# Patient Record
Sex: MALE | Race: ASIAN | NOT HISPANIC OR LATINO | URBAN - METROPOLITAN AREA
[De-identification: names, ages, dates, MRNs, and addresses within clinical notes are randomized per-mention and may not be internally consistent; named-entity substitution may affect disease eponyms.]

---

## 2020-09-21 ENCOUNTER — EMERGENCY (EMERGENCY)
Facility: HOSPITAL | Age: 50
LOS: 1 days | Discharge: HOME | End: 2020-09-21
Attending: STUDENT IN AN ORGANIZED HEALTH CARE EDUCATION/TRAINING PROGRAM
Payer: MEDICAID

## 2020-09-21 VITALS
OXYGEN SATURATION: 95 % | DIASTOLIC BLOOD PRESSURE: 71 MMHG | SYSTOLIC BLOOD PRESSURE: 107 MMHG | HEIGHT: 73 IN | HEART RATE: 77 BPM | TEMPERATURE: 98 F | RESPIRATION RATE: 18 BRPM | WEIGHT: 153 LBS

## 2020-09-21 PROCEDURE — 99283 EMERGENCY DEPT VISIT LOW MDM: CPT

## 2020-09-21 RX ORDER — IBUPROFEN 200 MG
600 TABLET ORAL ONCE
Refills: 0 | Status: COMPLETED | OUTPATIENT
Start: 2020-09-21 | End: 2020-09-21

## 2020-09-21 RX ADMIN — Medication 600 MILLIGRAM(S): at 15:59

## 2020-09-21 NOTE — ED PROVIDER NOTE - PATIENT PORTAL LINK FT
You can access the FollowMyHealth Patient Portal offered by [facility] by registering at the following website: [email or web address]. By joining [employer]’s FollowMyHealth portal, you will also be able to view your health information using other applications (apps) compatible with our system.
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
## 2020-09-21 NOTE — ED ADULT NURSE NOTE - NSIMPLEMENTINTERV_GEN_ALL_ED
Implemented All Universal Safety Interventions:  [place] to call system. Call bell, personal items and telephone within reach. Instruct patient to call for assistance. Room bathroom lighting operational. Non-slip footwear when patient is off stretcher. Physically safe environment: no spills, clutter or unnecessary equipment. Stretcher in lowest position, wheels locked, appropriate side rails in place.
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
## 2020-09-21 NOTE — ED PROVIDER NOTE - NSFOLLOWUPINSTRUCTIONS_ED_ALL_ED_FT
Cervical Sprain (Neck Sprain)    A cervical sprain is when the connective tissues (ligaments) that hold the neck bones in place stretch or tear. Only take medicine as told by your doctor. You may apply heating or cooling pads (or ice) to help with the pain. Avoid positions and activities that make your problems worse.    SEEK IMMEDIATE MEDICAL CARE IF YOU HAVE ANY OF THE FOLLOWING SYMPTOMS: weakness, tingling, or numbness of part of the body, headache, dizziness or lightheadedness, fever, or difficulty swallowing or chewing.
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
## 2020-09-21 NOTE — ED PROVIDER NOTE - PHYSICAL EXAMINATION
PHYSICAL EXAMINATION:  VITALS REVIEWED.    GENERALIZED APPEARANCE:  Comfortable, no acute distress, ambulating without difficulty  SKIN:  Warm, dry, no cyanosis  HEAD:  No obvious scalp lesions, no focal tenderness  EYES:  Conjunctiva pink, no icterus  ENMT:  Mucus membranes moist, no stridor  NECK:  Supple, normal active neck ROM, no midline tenderness   CHEST AND RESPIRATORY:  Clear to auscultation B/L, good air entry B/L, equal chest expansion  HEART AND CARDIOVASCULAR:  Regular rate, no obvious murmur  ABDOMEN AND GI:  Soft, non-tender, non-distended.  No rebound, no guarding, no CVA-area tenderness  EXTREMITIES:  No deformity, edema, or calf tenderness, 5/5 strength in all 4 extremities    NEURO: AAOx3, gross motor and sensory intact  PSYCH: Normal affect

## 2020-09-21 NOTE — ED PROVIDER NOTE - NS ED ROS FT
ROS:  GEN: (-) fevers/chills, (-) weight loss, (-) fatigue/malaise  HEENT: (-) visual change, (-) photophobia, (-) nasal congestion/rhinorrhea, (-) difficulty swallowing  NECK: (-) stiffness, (-) swelling  RESP: (-) shortness of breath, (-) cough, (-) sputum, (-) hemoptysis, (-) DAVIS  CV: (-) chest pain, (-) palpitations, (-) PND/orthopnea  GI: (-) nausea, (-) vomiting, (-) pain, (-) constipation, (-) diarrhea, (-) melena, (-) BRBPR  : (-) frequency/urgency, (-) hematuria, (-) dysuria, (-) incontinence, (-) discharge  EXT: (-) edema, (-) cyanosis  ENDO: (-) heat/cold intolerance, (-) polyuria  NEURO: (-) paresthesias, (-) weakness, (-) headache, (-) dizziness, (-) syncope  Psych: (-) depression, (-) anxiety, (-) SI, (-) HI, (-) AH, (-) VH  MSK: no recent trauma, no muscle pain
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
## 2021-03-25 PROBLEM — Z00.00 ENCOUNTER FOR PREVENTIVE HEALTH EXAMINATION: Status: ACTIVE | Noted: 2021-03-25
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
## 2022-09-14 NOTE — ED ADULT NURSE NOTE - PAIN RATING/NUMBER SCALE (0-10): ACTIVITY
Immediate Post- Operative Note        Findings: Moderate Right Pleural Effusion    Procedure(s): US guided Right Thoracentesis      Estimated Blood Loss: Less than 5 ml        Complications: None            9/14/2022     3:57 PM     Isai Walker M.D.      4

## 2025-06-28 ENCOUNTER — INPATIENT (INPATIENT)
Facility: HOSPITAL | Age: 55
LOS: 9 days | Discharge: LEFT BEFORE DISCHARGE | DRG: 694 | End: 2025-07-08
Attending: HOSPITALIST | Admitting: STUDENT IN AN ORGANIZED HEALTH CARE EDUCATION/TRAINING PROGRAM
Payer: COMMERCIAL

## 2025-06-28 VITALS
WEIGHT: 169.98 LBS | HEIGHT: 73 IN | SYSTOLIC BLOOD PRESSURE: 119 MMHG | HEART RATE: 60 BPM | DIASTOLIC BLOOD PRESSURE: 84 MMHG | TEMPERATURE: 98 F | RESPIRATION RATE: 17 BRPM | OXYGEN SATURATION: 93 %

## 2025-06-28 LAB
ALBUMIN SERPL ELPH-MCNC: 4.3 G/DL — SIGNIFICANT CHANGE UP (ref 3.3–5)
ALP SERPL-CCNC: 96 U/L — SIGNIFICANT CHANGE UP (ref 40–120)
ALT FLD-CCNC: 34 U/L — SIGNIFICANT CHANGE UP (ref 10–45)
ANION GAP SERPL CALC-SCNC: 17 MMOL/L — SIGNIFICANT CHANGE UP (ref 5–17)
APTT BLD: 28.6 SEC — SIGNIFICANT CHANGE UP (ref 26.1–36.8)
AST SERPL-CCNC: 31 U/L — SIGNIFICANT CHANGE UP (ref 10–40)
BASOPHILS # BLD AUTO: 0.06 K/UL — SIGNIFICANT CHANGE UP (ref 0–0.2)
BASOPHILS NFR BLD AUTO: 0.6 % — SIGNIFICANT CHANGE UP (ref 0–2)
BILIRUB SERPL-MCNC: 0.8 MG/DL — SIGNIFICANT CHANGE UP (ref 0.2–1.2)
BLD GP AB SCN SERPL QL: NEGATIVE — SIGNIFICANT CHANGE UP
BUN SERPL-MCNC: 19 MG/DL — SIGNIFICANT CHANGE UP (ref 7–23)
CALCIUM SERPL-MCNC: 9.5 MG/DL — SIGNIFICANT CHANGE UP (ref 8.4–10.5)
CHLORIDE SERPL-SCNC: 106 MMOL/L — SIGNIFICANT CHANGE UP (ref 96–108)
CO2 SERPL-SCNC: 17 MMOL/L — LOW (ref 22–31)
CREAT SERPL-MCNC: 1.38 MG/DL — HIGH (ref 0.5–1.3)
EGFR: 60 ML/MIN/1.73M2 — SIGNIFICANT CHANGE UP
EGFR: 60 ML/MIN/1.73M2 — SIGNIFICANT CHANGE UP
EOSINOPHIL # BLD AUTO: 0.15 K/UL — SIGNIFICANT CHANGE UP (ref 0–0.5)
EOSINOPHIL NFR BLD AUTO: 1.5 % — SIGNIFICANT CHANGE UP (ref 0–6)
GAS PNL BLDV: SIGNIFICANT CHANGE UP
GLUCOSE SERPL-MCNC: 156 MG/DL — HIGH (ref 70–99)
HCT VFR BLD CALC: 52.7 % — HIGH (ref 39–50)
HGB BLD-MCNC: 18 G/DL — HIGH (ref 13–17)
IMM GRANULOCYTES # BLD AUTO: 0.04 K/UL — SIGNIFICANT CHANGE UP (ref 0–0.07)
IMM GRANULOCYTES NFR BLD AUTO: 0.4 % — SIGNIFICANT CHANGE UP (ref 0–0.9)
INR BLD: 1.05 RATIO — SIGNIFICANT CHANGE UP (ref 0.85–1.16)
LIDOCAIN IGE QN: 24 U/L — SIGNIFICANT CHANGE UP (ref 7–60)
LYMPHOCYTES # BLD AUTO: 1.68 K/UL — SIGNIFICANT CHANGE UP (ref 1–3.3)
LYMPHOCYTES NFR BLD AUTO: 17 % — SIGNIFICANT CHANGE UP (ref 13–44)
MCHC RBC-ENTMCNC: 30.8 PG — SIGNIFICANT CHANGE UP (ref 27–34)
MCHC RBC-ENTMCNC: 34.2 G/DL — SIGNIFICANT CHANGE UP (ref 32–36)
MCV RBC AUTO: 90.1 FL — SIGNIFICANT CHANGE UP (ref 80–100)
MONOCYTES # BLD AUTO: 0.51 K/UL — SIGNIFICANT CHANGE UP (ref 0–0.9)
MONOCYTES NFR BLD AUTO: 5.1 % — SIGNIFICANT CHANGE UP (ref 2–14)
NEUTROPHILS # BLD AUTO: 7.47 K/UL — HIGH (ref 1.8–7.4)
NEUTROPHILS NFR BLD AUTO: 75.4 % — SIGNIFICANT CHANGE UP (ref 43–77)
NRBC # BLD AUTO: 0 K/UL — SIGNIFICANT CHANGE UP (ref 0–0)
NRBC # FLD: 0 K/UL — SIGNIFICANT CHANGE UP (ref 0–0)
NRBC BLD AUTO-RTO: 0 /100 WBCS — SIGNIFICANT CHANGE UP (ref 0–0)
PLATELET # BLD AUTO: 196 K/UL — SIGNIFICANT CHANGE UP (ref 150–400)
PMV BLD: 12.7 FL — SIGNIFICANT CHANGE UP (ref 7–13)
POTASSIUM SERPL-MCNC: 4 MMOL/L — SIGNIFICANT CHANGE UP (ref 3.5–5.3)
POTASSIUM SERPL-SCNC: 4 MMOL/L — SIGNIFICANT CHANGE UP (ref 3.5–5.3)
PROT SERPL-MCNC: 7 G/DL — SIGNIFICANT CHANGE UP (ref 6–8.3)
PROTHROM AB SERPL-ACNC: 12 SEC — SIGNIFICANT CHANGE UP (ref 9.9–13.4)
RBC # BLD: 5.85 M/UL — HIGH (ref 4.2–5.8)
RBC # FLD: 13.6 % — SIGNIFICANT CHANGE UP (ref 10.3–14.5)
RH IG SCN BLD-IMP: POSITIVE — SIGNIFICANT CHANGE UP
SODIUM SERPL-SCNC: 140 MMOL/L — SIGNIFICANT CHANGE UP (ref 135–145)
WBC # BLD: 9.91 K/UL — SIGNIFICANT CHANGE UP (ref 3.8–10.5)
WBC # FLD AUTO: 9.91 K/UL — SIGNIFICANT CHANGE UP (ref 3.8–10.5)

## 2025-06-28 PROCEDURE — 99291 CRITICAL CARE FIRST HOUR: CPT

## 2025-06-28 PROCEDURE — 71045 X-RAY EXAM CHEST 1 VIEW: CPT | Mod: 26

## 2025-06-28 PROCEDURE — 93010 ELECTROCARDIOGRAM REPORT: CPT | Mod: 76

## 2025-06-28 RX ORDER — ONDANSETRON HCL/PF 4 MG/2 ML
4 VIAL (ML) INJECTION ONCE
Refills: 0 | Status: COMPLETED | OUTPATIENT
Start: 2025-06-28 | End: 2025-06-28

## 2025-06-28 RX ORDER — METHYLPREDNISOLONE ACETATE 80 MG/ML
40 INJECTION, SUSPENSION INTRA-ARTICULAR; INTRALESIONAL; INTRAMUSCULAR; SOFT TISSUE ONCE
Refills: 0 | Status: COMPLETED | OUTPATIENT
Start: 2025-06-28 | End: 2025-06-28

## 2025-06-28 RX ORDER — ACETAMINOPHEN 500 MG/5ML
1000 LIQUID (ML) ORAL ONCE
Refills: 0 | Status: COMPLETED | OUTPATIENT
Start: 2025-06-28 | End: 2025-06-28

## 2025-06-28 RX ADMIN — Medication 1000 MILLILITER(S): at 22:25

## 2025-06-28 RX ADMIN — Medication 4 MILLIGRAM(S): at 22:18

## 2025-06-28 RX ADMIN — Medication 400 MILLIGRAM(S): at 22:25

## 2025-06-28 RX ADMIN — METHYLPREDNISOLONE ACETATE 40 MILLIGRAM(S): 80 INJECTION, SUSPENSION INTRA-ARTICULAR; INTRALESIONAL; INTRAMUSCULAR; SOFT TISSUE at 23:20

## 2025-06-28 RX ADMIN — Medication 4 MILLIGRAM(S): at 22:16

## 2025-06-28 RX ADMIN — Medication 20 MILLIGRAM(S): at 22:22
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
## 2025-06-28 NOTE — ED PROVIDER NOTE - CLINICAL SUMMARY MEDICAL DECISION MAKING FREE TEXT BOX
Brooklyn Zafar PGY-1:  55-year-old male presenting with sudden onset abdominal pain in the left upper quadrant that started this evening after eating dinner.  Had episodes of nausea and vomiting x 5-6.  No abdominal surgeries.  No dark or tarry stools.  Also reporting shortness of breath that has worsened over the past year.  Family at bedside reports he had seen a physician on Saturday regarding his shortness of breath and was told he had low oxygen and was pending further tests.  No headaches, chest pain, urinary symptoms.    On arrival to ED patient is very uncomfortable.  Unable to obtain abdominal exam due to pain.  Patient is tachypneic and appears short of breath while talking.  He is afebrile.      After dose of pain medication patient is able to tolerate exam.  Tenderness to palpation in the left upper quadrant.  Patient becomes hypoxic to 88 on room air.  Was placed on 4 L nasal cannula with oxygen saturations in the 90s.  Patient was then placed on high flow nasal cannula with improvement in saturations to 95%.  EKG showing T wave inversions in inferior leads. lungs clear to auscultation bilaterally and no lower extremity edema.     Concern for intra-abdominal pathology and possible cardiac/pulmonary etiology.  Will obtain labs, CT chest abdomen pelvis. Brooklyn Zafar PGY-1:  55-year-old male presenting with sudden onset abdominal pain in the left upper quadrant that started this evening after eating dinner.  Had episodes of nausea and vomiting x 5-6.  No abdominal surgeries.  No dark or tarry stools.  Also reporting shortness of breath that has worsened over the past year.  Family at bedside reports he had seen a physician on Saturday regarding his shortness of breath and was told he had low oxygen and was pending further tests.  No headaches, chest pain, urinary symptoms.    On arrival to ED patient is very uncomfortable.  Unable to obtain abdominal exam due to pain.  Patient is tachypneic and appears short of breath while talking.  He is afebrile.      After dose of pain medication patient is able to tolerate exam.  Tenderness to palpation in the left upper quadrant.  Patient becomes hypoxic to 88 on room air.  Was placed on 4 L nasal cannula with oxygen saturations in the 90s.  Patient was then placed on high flow nasal cannula with improvement in saturations to 95%.  EKG showing T wave inversions in inferior leads. lungs clear to auscultation bilaterally and no lower extremity edema.     Concern for intra-abdominal pathology and possible cardiac/pulmonary etiology.  Will obtain labs, CT chest abdomen pelvis.    RGUJRAL Agree with resident note above. Patient presents with family, declined  services. Pt presents with acute onset of LUQ abd pain after eating. Denies any initial chest pain, SOB, cough, fever. Pt unable to give history or cooperate with exam due to pain and vomiting. Pt medicated and placed on tele monitor and noted to be hypoxic. Pt now states he was recently evaluated by his doctor and told him that his oxygen levels were low in the 70s. Pt has history of smoking. Currently denies any chest pain. On exam, Lungs cta b/l, +s1s2, abd soft guarded, ttp LUQ. Pt placed on NC and escalated to HF. Will obtain Labs, CT chest and abd, flu/covid to eval for symptoms. EKG reviewed, will obtain cards consult. DDx ro ACS, Pulm HTN, PE, intrabd path. Closely monitor.

## 2025-06-28 NOTE — ED PROVIDER NOTE - PHYSICAL EXAMINATION
GENERAL APPEARANCE:  AxOx4, uncomfortable appearing M  HEENT:  NC, AT. dry mucous membranes. EOMI, clear conjunctiva, oropharynx clear.   NECK:  Supple without lymphadenopathy.  No stiffness or restricted ROM.   HEART:  Normal rate and regular rhythm, normal S1/S1, no m/r/g   LUNGS:  CTAB, moving air well. No crackles or wheezes are heard.   ABDOMEN:  soft, tenderness in LUQ  BACK: No CVAT, no obvious deformity.   EXTREMITIES:  Without cyanosis, clubbing or edema.   NEUROLOGICAL:  Grossly nonfocal. Alert and oriented, moving all 4 extremities. CN not formally tested but appear grossly intact.   SKIN:  Warm and dry without any rash.

## 2025-06-28 NOTE — ED PROVIDER NOTE - SECONDARY DIAGNOSIS.
"Wound care supplies were not ordered or needed today. Home care will order all your supplies    Continue on all blood pressure medications as prescribed    Focus on weight loss and low sodium diet  Keep sodium intake 2.5-4 grams per day    Keep appt with Sleep medicine; they are concerned that your BP is not being controlled due to uncontrolled sleep apnea        Wound Care Instructions    Every other day home care or your family will , Cleanse your bilateral legs and wound(s) with Dilute hibiclens 30cc in 500cc NS.    Then do a light wash of Dakin's solution; focus on the right lateral leg weeping area    Pat Dry with non-sterile gauze    Apply Lotion to the intact skin surrounding your wound and other dry skin locations. Some good lotions include: Remedy Skin Repair Cream, Sarna, Vanicream or Cetaphil    Apply Ammonium Lac Hydrin lotion to the thick scaling crusting areas    Triad paste to the periwound skin on the right leg to protect from all the drainage    Primary Dressing: Silvercel to all the wounds    ABDs or super  absorbant pads    Secure with non-sterile roll gauze (4\" x 75\" roll) and tape (1\" roll tape) as needed; avoid adhesive directly on the skin    Compression: high density gray foam to the right lateral leg area secure with rolled gauze; then tubular compression; foam; short stretch bilaterally    Elevation of the legs    Use lymphedema pump twice per day    It is not ok to get your wound wet in the bath or shower      If for some reason you are not able to get your dressing(s) changed as outlined above (due to illness, lack of supplies, lack of help) please do the following: remove old, soiled dressings; wash the wounds with saline; pat dry; apply ABD pad or other absorbant pad and secure with rolled gauze; avoid tape directly on your skin; Call the clinic as soon as possible to let us know what the current issues are in receiving wound care [phone].      SEEK MEDICAL CARE IF:  You have an " increase in swelling, pain, or redness around the wound.  You have an increase in the amount of pus coming from the wound.  There is a bad smell coming from the wound.  The wound appears to be worsening/enlarging  You have a fever greater than 101.5 F      It is ok to continue current wound care treatment/products for the next 2-3 days until new wound care supplies are ordered and arrive. If longer than this please contact our office at [phone].    If you have a 2 layer or 4 layer compression wrap on these are safe to have on for ONLY 7 days. If for some reason you are not able to get the wrap(s) changed (due to illness; lack of supplies, lack of help, lack of transportation) please do the following: unwrap the old 2 or 4 layer compression wrap; avoid using scissors as you could cut your skin and cause wounds; use tubular compression when available. Call to reschedule your home care or clinic visit appointment as soon as possible.    Please NOTE: if you are 15 minutes late to your clinic appointment you will have to be rescheduled. Please call our clinic as soon as possible if you know you will not be able to get to your appointment at [phone].    If you fail to show up to 3 scheduled clinic appointments you will be dismissed from our clinic.              We want to hear from you!  In the next few weeks, you should receive a call or email to complete a survey about your visit at [facility]. Please help us improve your appointment experience by letting us know how we did today. We strive to make your experience good and value any ways in which we could do better.      We value your input and suggestions.    Thank you for choosing the [facility]!      It is recommended that you do not get your ulcer wet when showering.  Listed below are several ways of keeping it dry when you shower.     1. Wrap it with Press and Seal plastic wrap.  It can be found in the stores where  the plastic wraps or tin foil is kept.               2.  Some people take a bath and hang their leg/foot out of the tub.                        3  Put your leg in a plastic bag and tape it on.           4. You can purchase a shower cover for casts at some pharmacies and through the Internet.            5. Take a Bed Bath or wash up at the sink             Hypoxia

## 2025-06-28 NOTE — ED PROVIDER NOTE - PROGRESS NOTE DETAILS
Attending DO Rimma: I received sign out on this patient. I am aware of the previously determined ongoing plan of care and what, if any, tests/consults are pending from the previous provider. I am available for supervision of the ongoing plan of care for the Resident/BE/Fellow/Student.     Pending CT chest abd pelvis. Patient with persistent pain, borderline bp. Will redose with fentanyl for pain control. POCUS by prior team A line predominant, remains on HFNC. Attending Dr. Shanks: CT resulted with 4mm obstructive kidney stone and ?filling defect in pulmonary artery. Kidney stone explains the pain, but not the hypoxia. PERT consulted for further guidance on management of possible PE with hypoxia. Patient and family updated at bedside. Attending Dr. Shanks: Spoke with cards fellow (PERT) and given dimer is negative, recommending discontinuing heparin gtt and pursuing alternative causes of hypoxia. Pending uro recs for kidney stone. Anticipate admission for pain control, respiratory support, inpatient pulm consult. Attending Dr. Shanks: Spoke with uro PA, recommendations not finalized.

## 2025-06-29 DIAGNOSIS — J96.21 ACUTE AND CHRONIC RESPIRATORY FAILURE WITH HYPOXIA: ICD-10-CM

## 2025-06-29 DIAGNOSIS — R62.7 ADULT FAILURE TO THRIVE: ICD-10-CM

## 2025-06-29 DIAGNOSIS — N20.0 CALCULUS OF KIDNEY: ICD-10-CM

## 2025-06-29 DIAGNOSIS — R55 SYNCOPE AND COLLAPSE: ICD-10-CM

## 2025-06-29 DIAGNOSIS — Z29.9 ENCOUNTER FOR PROPHYLACTIC MEASURES, UNSPECIFIED: ICD-10-CM

## 2025-06-29 DIAGNOSIS — R09.89 OTHER SPECIFIED SYMPTOMS AND SIGNS INVOLVING THE CIRCULATORY AND RESPIRATORY SYSTEMS: ICD-10-CM

## 2025-06-29 LAB
ANION GAP SERPL CALC-SCNC: 14 MMOL/L — SIGNIFICANT CHANGE UP (ref 5–17)
APPEARANCE UR: ABNORMAL
APTT BLD: 28.1 SEC — SIGNIFICANT CHANGE UP (ref 26.1–36.8)
BACTERIA # UR AUTO: NEGATIVE /HPF — SIGNIFICANT CHANGE UP
BASE EXCESS BLDV CALC-SCNC: -5.8 MMOL/L — LOW (ref -2–3)
BASOPHILS # BLD AUTO: 0.01 K/UL — SIGNIFICANT CHANGE UP (ref 0–0.2)
BASOPHILS NFR BLD AUTO: 0.1 % — SIGNIFICANT CHANGE UP (ref 0–2)
BILIRUB UR-MCNC: NEGATIVE — SIGNIFICANT CHANGE UP
BUN SERPL-MCNC: 19 MG/DL — SIGNIFICANT CHANGE UP (ref 7–23)
CALCIUM SERPL-MCNC: 8.4 MG/DL — SIGNIFICANT CHANGE UP (ref 8.4–10.5)
CAST: 0 /LPF — SIGNIFICANT CHANGE UP (ref 0–4)
CHLORIDE SERPL-SCNC: 109 MMOL/L — HIGH (ref 96–108)
CO2 BLDV-SCNC: 21 MMOL/L — LOW (ref 22–26)
CO2 SERPL-SCNC: 14 MMOL/L — LOW (ref 22–31)
COLOR SPEC: SIGNIFICANT CHANGE UP
CREAT SERPL-MCNC: 1.26 MG/DL — SIGNIFICANT CHANGE UP (ref 0.5–1.3)
D DIMER BLD IA.RAPID-MCNC: <150 NG/ML DDU — SIGNIFICANT CHANGE UP
DIFF PNL FLD: ABNORMAL
EGFR: 67 ML/MIN/1.73M2 — SIGNIFICANT CHANGE UP
EGFR: 67 ML/MIN/1.73M2 — SIGNIFICANT CHANGE UP
EOSINOPHIL # BLD AUTO: 0 K/UL — SIGNIFICANT CHANGE UP (ref 0–0.5)
EOSINOPHIL NFR BLD AUTO: 0 % — SIGNIFICANT CHANGE UP (ref 0–6)
GAS PNL BLDV: SIGNIFICANT CHANGE UP
GAS PNL BLDV: SIGNIFICANT CHANGE UP
GLUCOSE SERPL-MCNC: 138 MG/DL — HIGH (ref 70–99)
GLUCOSE UR QL: NEGATIVE MG/DL — SIGNIFICANT CHANGE UP
HCO3 BLDV-SCNC: 20 MMOL/L — LOW (ref 22–29)
HCT VFR BLD CALC: 49.5 % — SIGNIFICANT CHANGE UP (ref 39–50)
HCT VFR BLD CALC: 50.5 % — HIGH (ref 39–50)
HGB BLD-MCNC: 16.4 G/DL — SIGNIFICANT CHANGE UP (ref 13–17)
HGB BLD-MCNC: 17 G/DL — SIGNIFICANT CHANGE UP (ref 13–17)
IMM GRANULOCYTES # BLD AUTO: 0.03 K/UL — SIGNIFICANT CHANGE UP (ref 0–0.07)
IMM GRANULOCYTES NFR BLD AUTO: 0.4 % — SIGNIFICANT CHANGE UP (ref 0–0.9)
KETONES UR QL: ABNORMAL MG/DL
LEUKOCYTE ESTERASE UR-ACNC: NEGATIVE — SIGNIFICANT CHANGE UP
LYMPHOCYTES # BLD AUTO: 0.68 K/UL — LOW (ref 1–3.3)
LYMPHOCYTES NFR BLD AUTO: 10.1 % — LOW (ref 13–44)
MCHC RBC-ENTMCNC: 30.5 PG — SIGNIFICANT CHANGE UP (ref 27–34)
MCHC RBC-ENTMCNC: 31.1 PG — SIGNIFICANT CHANGE UP (ref 27–34)
MCHC RBC-ENTMCNC: 33.1 G/DL — SIGNIFICANT CHANGE UP (ref 32–36)
MCHC RBC-ENTMCNC: 33.7 G/DL — SIGNIFICANT CHANGE UP (ref 32–36)
MCV RBC AUTO: 92 FL — SIGNIFICANT CHANGE UP (ref 80–100)
MCV RBC AUTO: 92.3 FL — SIGNIFICANT CHANGE UP (ref 80–100)
MONOCYTES # BLD AUTO: 0.1 K/UL — SIGNIFICANT CHANGE UP (ref 0–0.9)
MONOCYTES NFR BLD AUTO: 1.5 % — LOW (ref 2–14)
NEUTROPHILS # BLD AUTO: 5.88 K/UL — SIGNIFICANT CHANGE UP (ref 1.8–7.4)
NEUTROPHILS NFR BLD AUTO: 87.9 % — HIGH (ref 43–77)
NITRITE UR-MCNC: NEGATIVE — SIGNIFICANT CHANGE UP
NRBC # BLD AUTO: 0 K/UL — SIGNIFICANT CHANGE UP (ref 0–0)
NRBC # BLD AUTO: 0 K/UL — SIGNIFICANT CHANGE UP (ref 0–0)
NRBC # FLD: 0 K/UL — SIGNIFICANT CHANGE UP (ref 0–0)
NRBC # FLD: 0 K/UL — SIGNIFICANT CHANGE UP (ref 0–0)
NRBC BLD AUTO-RTO: 0 /100 WBCS — SIGNIFICANT CHANGE UP (ref 0–0)
NRBC BLD AUTO-RTO: 0 /100 WBCS — SIGNIFICANT CHANGE UP (ref 0–0)
PCO2 BLDV: 37 MMHG — LOW (ref 42–55)
PH BLDV: 7.33 — SIGNIFICANT CHANGE UP (ref 7.32–7.43)
PH UR: 5.5 — SIGNIFICANT CHANGE UP (ref 5–8)
PLATELET # BLD AUTO: 171 K/UL — SIGNIFICANT CHANGE UP (ref 150–400)
PLATELET # BLD AUTO: 172 K/UL — SIGNIFICANT CHANGE UP (ref 150–400)
PMV BLD: 12 FL — SIGNIFICANT CHANGE UP (ref 7–13)
PMV BLD: 12.4 FL — SIGNIFICANT CHANGE UP (ref 7–13)
PO2 BLDV: 43 MMHG — SIGNIFICANT CHANGE UP (ref 25–45)
POTASSIUM SERPL-MCNC: 4.6 MMOL/L — SIGNIFICANT CHANGE UP (ref 3.5–5.3)
POTASSIUM SERPL-MCNC: 6.1 MMOL/L — HIGH (ref 3.5–5.3)
POTASSIUM SERPL-SCNC: 4.6 MMOL/L — SIGNIFICANT CHANGE UP (ref 3.5–5.3)
POTASSIUM SERPL-SCNC: 6.1 MMOL/L — HIGH (ref 3.5–5.3)
PROT UR-MCNC: SIGNIFICANT CHANGE UP MG/DL
RBC # BLD: 5.38 M/UL — SIGNIFICANT CHANGE UP (ref 4.2–5.8)
RBC # BLD: 5.47 M/UL — SIGNIFICANT CHANGE UP (ref 4.2–5.8)
RBC # FLD: 14 % — SIGNIFICANT CHANGE UP (ref 10.3–14.5)
RBC # FLD: 14 % — SIGNIFICANT CHANGE UP (ref 10.3–14.5)
RBC CASTS # UR COMP ASSIST: 58 /HPF — HIGH (ref 0–4)
REVIEW: SIGNIFICANT CHANGE UP
SAO2 % BLDV: 71.9 % — SIGNIFICANT CHANGE UP (ref 67–88)
SODIUM SERPL-SCNC: 137 MMOL/L — SIGNIFICANT CHANGE UP (ref 135–145)
SP GR SPEC: >1.03 — HIGH (ref 1–1.03)
SQUAMOUS # UR AUTO: 0 /HPF — SIGNIFICANT CHANGE UP (ref 0–5)
UROBILINOGEN FLD QL: 0.2 MG/DL — SIGNIFICANT CHANGE UP (ref 0.2–1)
WBC # BLD: 6.7 K/UL — SIGNIFICANT CHANGE UP (ref 3.8–10.5)
WBC # BLD: 6.78 K/UL — SIGNIFICANT CHANGE UP (ref 3.8–10.5)
WBC # FLD AUTO: 6.7 K/UL — SIGNIFICANT CHANGE UP (ref 3.8–10.5)
WBC # FLD AUTO: 6.78 K/UL — SIGNIFICANT CHANGE UP (ref 3.8–10.5)
WBC UR QL: 2 /HPF — SIGNIFICANT CHANGE UP (ref 0–5)

## 2025-06-29 PROCEDURE — 85018 HEMOGLOBIN: CPT

## 2025-06-29 PROCEDURE — 80048 BASIC METABOLIC PNL TOTAL CA: CPT

## 2025-06-29 PROCEDURE — 85730 THROMBOPLASTIN TIME PARTIAL: CPT

## 2025-06-29 PROCEDURE — 82330 ASSAY OF CALCIUM: CPT

## 2025-06-29 PROCEDURE — 83880 ASSAY OF NATRIURETIC PEPTIDE: CPT

## 2025-06-29 PROCEDURE — 85014 HEMATOCRIT: CPT

## 2025-06-29 PROCEDURE — 83605 ASSAY OF LACTIC ACID: CPT

## 2025-06-29 PROCEDURE — 85379 FIBRIN DEGRADATION QUANT: CPT

## 2025-06-29 PROCEDURE — 74177 CT ABD & PELVIS W/CONTRAST: CPT | Mod: 26

## 2025-06-29 PROCEDURE — 71275 CT ANGIOGRAPHY CHEST: CPT | Mod: 26

## 2025-06-29 PROCEDURE — 85610 PROTHROMBIN TIME: CPT

## 2025-06-29 PROCEDURE — 86901 BLOOD TYPING SEROLOGIC RH(D): CPT

## 2025-06-29 PROCEDURE — 74177 CT ABD & PELVIS W/CONTRAST: CPT

## 2025-06-29 PROCEDURE — 83735 ASSAY OF MAGNESIUM: CPT

## 2025-06-29 PROCEDURE — 86900 BLOOD TYPING SEROLOGIC ABO: CPT

## 2025-06-29 PROCEDURE — 82435 ASSAY OF BLOOD CHLORIDE: CPT

## 2025-06-29 PROCEDURE — 84295 ASSAY OF SERUM SODIUM: CPT

## 2025-06-29 PROCEDURE — 71275 CT ANGIOGRAPHY CHEST: CPT | Mod: 26,77

## 2025-06-29 PROCEDURE — 85027 COMPLETE CBC AUTOMATED: CPT

## 2025-06-29 PROCEDURE — 71045 X-RAY EXAM CHEST 1 VIEW: CPT

## 2025-06-29 PROCEDURE — 80053 COMPREHEN METABOLIC PANEL: CPT

## 2025-06-29 PROCEDURE — 93970 EXTREMITY STUDY: CPT

## 2025-06-29 PROCEDURE — 84132 ASSAY OF SERUM POTASSIUM: CPT

## 2025-06-29 PROCEDURE — 71275 CT ANGIOGRAPHY CHEST: CPT

## 2025-06-29 PROCEDURE — 93005 ELECTROCARDIOGRAM TRACING: CPT

## 2025-06-29 PROCEDURE — 86850 RBC ANTIBODY SCREEN: CPT

## 2025-06-29 PROCEDURE — 82803 BLOOD GASES ANY COMBINATION: CPT

## 2025-06-29 PROCEDURE — 99222 1ST HOSP IP/OBS MODERATE 55: CPT

## 2025-06-29 PROCEDURE — 99223 1ST HOSP IP/OBS HIGH 75: CPT

## 2025-06-29 PROCEDURE — 84484 ASSAY OF TROPONIN QUANT: CPT

## 2025-06-29 PROCEDURE — 81001 URINALYSIS AUTO W/SCOPE: CPT

## 2025-06-29 PROCEDURE — 85025 COMPLETE CBC W/AUTO DIFF WBC: CPT

## 2025-06-29 PROCEDURE — 93970 EXTREMITY STUDY: CPT | Mod: 26

## 2025-06-29 PROCEDURE — 82947 ASSAY GLUCOSE BLOOD QUANT: CPT

## 2025-06-29 PROCEDURE — 93880 EXTRACRANIAL BILAT STUDY: CPT | Mod: 26

## 2025-06-29 PROCEDURE — 83690 ASSAY OF LIPASE: CPT

## 2025-06-29 PROCEDURE — 93880 EXTRACRANIAL BILAT STUDY: CPT

## 2025-06-29 RX ORDER — FENTANYL CITRATE-0.9 % NACL/PF 100MCG/2ML
50 SYRINGE (ML) INTRAVENOUS ONCE
Refills: 0 | Status: DISCONTINUED | OUTPATIENT
Start: 2025-06-29 | End: 2025-06-29

## 2025-06-29 RX ORDER — HEPARIN SODIUM 1000 [USP'U]/ML
INJECTION INTRAVENOUS; SUBCUTANEOUS
Qty: 25000 | Refills: 0 | Status: DISCONTINUED | OUTPATIENT
Start: 2025-06-29 | End: 2025-06-29

## 2025-06-29 RX ORDER — HYDROMORPHONE/SOD CHLOR,ISO/PF 2 MG/10 ML
0.5 SYRINGE (ML) INJECTION ONCE
Refills: 0 | Status: DISCONTINUED | OUTPATIENT
Start: 2025-06-29 | End: 2025-06-29

## 2025-06-29 RX ORDER — ACETAMINOPHEN 500 MG/5ML
1000 LIQUID (ML) ORAL ONCE
Refills: 0 | Status: COMPLETED | OUTPATIENT
Start: 2025-06-29 | End: 2025-06-29

## 2025-06-29 RX ORDER — HEPARIN SODIUM 1000 [USP'U]/ML
5000 INJECTION INTRAVENOUS; SUBCUTANEOUS ONCE
Refills: 0 | Status: COMPLETED | OUTPATIENT
Start: 2025-06-29 | End: 2025-06-29

## 2025-06-29 RX ORDER — HEPARIN SODIUM 1000 [USP'U]/ML
5000 INJECTION INTRAVENOUS; SUBCUTANEOUS EVERY 6 HOURS
Refills: 0 | Status: DISCONTINUED | OUTPATIENT
Start: 2025-06-29 | End: 2025-06-29

## 2025-06-29 RX ORDER — ACETAMINOPHEN 500 MG/5ML
975 LIQUID (ML) ORAL EVERY 8 HOURS
Refills: 0 | Status: DISCONTINUED | OUTPATIENT
Start: 2025-06-29 | End: 2025-07-08

## 2025-06-29 RX ORDER — ENOXAPARIN SODIUM 100 MG/ML
40 INJECTION SUBCUTANEOUS EVERY 24 HOURS
Refills: 0 | Status: DISCONTINUED | OUTPATIENT
Start: 2025-06-29 | End: 2025-07-08

## 2025-06-29 RX ORDER — SODIUM CHLORIDE 9 G/1000ML
1000 INJECTION, SOLUTION INTRAVENOUS
Refills: 0 | Status: DISCONTINUED | OUTPATIENT
Start: 2025-06-29 | End: 2025-07-01

## 2025-06-29 RX ORDER — HEPARIN SODIUM 1000 [USP'U]/ML
2500 INJECTION INTRAVENOUS; SUBCUTANEOUS EVERY 6 HOURS
Refills: 0 | Status: DISCONTINUED | OUTPATIENT
Start: 2025-06-29 | End: 2025-06-29

## 2025-06-29 RX ORDER — TAMSULOSIN HYDROCHLORIDE 0.4 MG/1
0.4 CAPSULE ORAL AT BEDTIME
Refills: 0 | Status: DISCONTINUED | OUTPATIENT
Start: 2025-06-29 | End: 2025-07-08

## 2025-06-29 RX ORDER — SODIUM CHLORIDE 9 G/1000ML
1000 INJECTION, SOLUTION INTRAVENOUS
Refills: 0 | Status: COMPLETED | OUTPATIENT
Start: 2025-06-29 | End: 2025-06-29

## 2025-06-29 RX ADMIN — Medication 20 MILLIGRAM(S): at 13:38

## 2025-06-29 RX ADMIN — Medication 50 MICROGRAM(S): at 00:13

## 2025-06-29 RX ADMIN — HEPARIN SODIUM 5000 UNIT(S): 1000 INJECTION INTRAVENOUS; SUBCUTANEOUS at 04:06

## 2025-06-29 RX ADMIN — Medication 400 MILLIGRAM(S): at 13:38

## 2025-06-29 RX ADMIN — Medication 50 MICROGRAM(S): at 09:34

## 2025-06-29 RX ADMIN — TAMSULOSIN HYDROCHLORIDE 0.4 MILLIGRAM(S): 0.4 CAPSULE ORAL at 21:50

## 2025-06-29 RX ADMIN — Medication 0.5 MILLIGRAM(S): at 16:30

## 2025-06-29 RX ADMIN — Medication 4 MILLIGRAM(S): at 11:43

## 2025-06-29 RX ADMIN — HEPARIN SODIUM 1100 UNIT(S)/HR: 1000 INJECTION INTRAVENOUS; SUBCUTANEOUS at 04:07

## 2025-06-29 RX ADMIN — Medication 1000 MILLIGRAM(S): at 16:20

## 2025-06-29 RX ADMIN — Medication 975 MILLIGRAM(S): at 16:20

## 2025-06-29 RX ADMIN — Medication 975 MILLIGRAM(S): at 08:53

## 2025-06-29 RX ADMIN — Medication 50 MICROGRAM(S): at 02:25

## 2025-06-29 RX ADMIN — Medication 1000 MILLIGRAM(S): at 09:34

## 2025-06-29 RX ADMIN — ENOXAPARIN SODIUM 40 MILLIGRAM(S): 100 INJECTION SUBCUTANEOUS at 21:49

## 2025-06-29 RX ADMIN — SODIUM CHLORIDE 100 MILLILITER(S): 9 INJECTION, SOLUTION INTRAVENOUS at 21:50

## 2025-06-29 RX ADMIN — SODIUM CHLORIDE 100 MILLILITER(S): 9 INJECTION, SOLUTION INTRAVENOUS at 08:53

## 2025-06-29 RX ADMIN — Medication 500 MILLILITER(S): at 03:55
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
## 2025-06-29 NOTE — CONSULT NOTE ADULT - ASSESSMENT
Patient is a 56 YO M with no known significant PMHx who initially presented to the ED with abdominal pain. On arrival to the ED vitals noted at T 98.3 | HR 60 | RR 17 | /84 | SpO2 93% ORA, later desaturated requiring HFNC. Initial ECG NSR, TWIs in II, III, aVF, V2-V3 w/ rightward axis pulmonary strain pattern). Initial CBC w/ elevated H/H, otherwise WNL. CMP w/ azotemia (Cr 1.38, no baseline for comparison), otherwise WNL. hsTrop 8. pro-. VBG w/ lactic acidosis to 3.3. CXR w/o acute pathology. CTA chest PE study w/ suboptimal opacification of the peripheral arterial vasculature; area of concern within the periphery of the right lower lobe lobar artery, (301 image 69) is felt to be related to mixing artifact. Additionally of note with Scattered bilateral pulmonary nodules measuring up to 5 mm in the left upper lobe. CT A/P w/ 4 mm calculus within the mid left ureter with associated mild to moderate left renal hydroureteronephrosis. In the ED patient recieved 1L NS, multiple levels of analgesics. PERT consulted given abnormal CTA findings w/ associated hypoxia.     #Concern for PE  - Patient w/ hypoxic respiratory failure requiring HFNC; CTA non-diagnostic with no large central/saddle PE and mixing artifact throughout the right and left main pulmonary artery and distal vessels  - RA and RV dilated on CT  - ECG w/ pulmonary strain pattern  - hsTrop negative, pro-BNP mildly elevated with evidence of mild RH strain on CTA  - Can treat as intermediate-low risk PE for now, would start heparin ggt  - Please add on ddimer for negative predictive value; if WNL can stop heparin and pursue alternative cause of hypoxia  - Would ideally repeat CTA to rule-in or out PE, though renal injury may be limiting factor  - Obtain TTE  - Obtain LE dopplers Patient is a 56 YO M with no known significant PMHx who initially presented to the ED with abdominal pain. On arrival to the ED vitals noted at T 98.3 | HR 60 | RR 17 | /84 | SpO2 93% ORA, later desaturated requiring HFNC. Initial ECG NSR, TWIs in II, III, aVF, V2-V3 w/ rightward axis pulmonary strain pattern). Initial CBC w/ elevated H/H, otherwise WNL. CMP w/ azotemia (Cr 1.38, no baseline for comparison), otherwise WNL. hsTrop 8. pro-. VBG w/ lactic acidosis to 3.3. CXR w/o acute pathology. CTA chest PE study w/ suboptimal opacification of the peripheral arterial vasculature; area of concern within the periphery of the right lower lobe lobar artery, (301 image 69) is felt to be related to mixing artifact. Additionally of note with Scattered bilateral pulmonary nodules measuring up to 5 mm in the left upper lobe. CT A/P w/ 4 mm calculus within the mid left ureter with associated mild to moderate left renal hydroureteronephrosis. In the ED patient recieved 1L NS, multiple levels of analgesics. PERT consulted given abnormal CTA findings w/ associated hypoxia.     #Concern for PE  - Patient w/ hypoxic respiratory failure requiring HFNC; CTA non-diagnostic with no large central/saddle PE and mixing artifact throughout the right and left main pulmonary artery and distal vessels  - RA and RV dilated on CT  - ECG w/ pulmonary strain pattern  - hsTrop negative, pro-BNP mildly elevated with evidence of mild RH strain on CTA  - D-dimer negative - would not treat as PE   - Would ideally repeat CTA to rule-in or out PE, though renal injury may be limiting factor  - Obtain TTE  - Obtain LE dopplers

## 2025-06-29 NOTE — H&P ADULT - PROBLEM SELECTOR PLAN 1
H/H 18/53, CTA suboptimal study for PE but showed emphysema - suspect chronic hypoxia leading to erythrocytosis with history of extensive smoking (35+ years, quit 1 month ago) consistent with symptoms of progressive DAVIS for the past year and recent TTE showing LIUDMILA/RVE (likely pulm HTN as well)  - will need to r/o PE with repeat CTA pending serum creatinine this am, will check LE doppler r/o DVT  - will repeat TTE to evaluate structural heart disease, LV EF% and pulm HTN  - will eventually need KEKE to r/o sinus venous ASD  - Cardiology consult appreciated  - s/p solumedrol 40 mg iv one dose in ED, will consider Pulmonary consult of emphysema/hypoxia H/H 18/53, CTA suboptimal study for PE but showed emphysema - suspect chronic hypoxia leading to erythrocytosis with history of extensive smoking (35+ years, quit 1 month ago) consistent with symptoms of progressive DAVIS for the past year and recent TTE showing LIUDMILA/RVE (likely pulm HTN as well)  - will need to r/o PE with repeat CTA pending serum creatinine this am, will check LE doppler r/o DVT  - will repeat TTE to evaluate structural heart disease, LV EF% and pulm HTN  - will eventually need KEKE to r/o sinus venous ASD  - Cardiology consult appreciated  - s/p solumedrol 40 mg iv one dose in ED, will consider Pulmonary consult of emphysema/hypoxia  - continue Hi flow NC, taper as tolerated

## 2025-06-29 NOTE — H&P ADULT - ASSESSMENT
55 year-old male former extensive smoker, kidney stone presents with severe abdominal pain, found be hypoxic to 80's RA, 4mm left ureter stone with associated hydro, but suboptimal CTA (cannot r/o PE) admitted for further work-up of hypoxia and syncope.      SpO2 93% ORA, later desaturated requiring HFNC. Initial ECG NSR, TWIs in II, III, aVF, V2-V3 w/ rightward axis pulmonary strain pattern). Initial CBC w/ elevated H/H, otherwise WNL. CMP w/ azotemia (Cr 1.38, no baseline for comparison), otherwise WNL. hsTrop 8. pro-. VBG w/ lactic acidosis to 3.3. CXR w/o acute pathology. CTA chest PE study w/ suboptimal opacification of the peripheral arterial vasculature; area of concern within the periphery of the right lower lobe lobar artery, (301 image 69) is felt to be related to mixing artifact. Additionally of note with Scattered bilateral pulmonary nodules measuring up to 5 mm in the left upper lobe. CT A/P w/ 4 mm calculus within the mid left ureter with associated mild to moderate left renal hydroureteronephrosis. In the ED patient recieved 1L NS, multiple levels of analgesics.

## 2025-06-29 NOTE — CONSULT NOTE ADULT - SUBJECTIVE AND OBJECTIVE BOX
Cardiology Consult Note   [Please check [email or web address] password: [email or web address]" for cardiology service schedule and contact information]    HPI:  Patient is a 56 YO M with no known significant PMHx who initially presented to the ED with abdominal pain.       ED Course: On arrival to the ED vitals noted at T 98.3 | HR 60 | RR 17 | /84 | SpO2 93% ORA, later desaturated requiring HFNC. Initial ECG NSR, TWIs in II, III, aVF, V2-V3 w/ rightward axis pulmonary strain pattern). Initial CBC w/ elevated H/H, otherwise WNL. CMP w/ azotemia (Cr 1.38, no baseline for comparison), otherwise WNL. hsTrop 8. pro-. VBG w/ lactic acidosis to 3.3. CXR w/o acute pathology. CTA chest PE study w/ suboptimal opacification of the peripheral arterial vasculature; area of concern within the periphery of the right lower lobe lobar artery, (301 image 69) is felt to be related to mixing artifact. Additionally of note with Scattered bilateral pulmonary nodules measuring up to 5 mm in the left upper lobe. CT A/P w/ 4 mm calculus within the mid left ureter with associated mild to moderate left renal hydroureteronephrosis. In the ED patient recieved 1L NS, multiple levels of analgesics. PERT consulted given abnormal CTA findings w/ associated hypoxia.       PAST MEDICAL & SURGICAL HISTORY:  No pertinent past medical history      No significant past surgical history        FAMILY HISTORY:    SOCIAL HISTORY:  unchanged    MEDICATIONS:    -------------------------------------------------------------------------------------------  PHYSICAL EXAM:  T(C): 36.7 (06-28-25 @ 22:25), Max: 36.8 (06-28-25 @ 21:48)  HR: 54 (06-29-25 @ 02:27) (54 - 76)  BP: 104/73 (06-29-25 @ 02:27) (93/72 - 129/93)  RR: 22 (06-29-25 @ 02:27) (17 - 22)  SpO2: 93% (06-29-25 @ 02:27) (88% - 95%)  Wt(kg): --  I&O's Summary      GENERAL: NAD  HEAD: Atraumatic, Normocephalic.  ENT: Moist mucous membranes.  NECK: Supple, No JVD.  CHEST/LUNG: Clear to auscultation bilaterally; No rales, rhonchi, wheezing, or rubs. Unlabored respirations.  HEART: Regular rate and rhythm; No murmurs, rubs, or gallops.  ABDOMEN: Bowel sounds present; Soft, Nontender, Nondistended.   EXTREMITIES:  2+ Peripheral Pulses, brisk capillary refill. No clubbing, cyanosis, or edema.    -------------------------------------------------------------------------------------------  LABS:                          18.0   9.91  )-----------( 196      ( 28 Jun 2025 22:44 )             52.7     06-28    140  |  106  |  19  ----------------------------<  156[H]  4.0   |  17[L]  |  1.38[H]    Ca    9.5      28 Jun 2025 22:44  Mg     2.2     06-28    TPro  7.0  /  Alb  4.3  /  TBili  0.8  /  DBili  x   /  AST  31  /  ALT  34  /  AlkPhos  96  06-28    PT/INR - ( 28 Jun 2025 22:44 )   PT: 12.0 sec;   INR: 1.05 ratio         PTT - ( 28 Jun 2025 22:44 )  PTT:28.6 sec  CARDIAC MARKERS ( 28 Jun 2025 22:44 )  8 ng/L / x     / x     / x     / x     / x                  -------------------------------------------------------------------------------------------  Cardiovascular Diagnostic Testing:    ECG: ECG NSR, TWIs in II, III, aVF, V2-V3 w/ rightward axis pulmonary strain pattern)    Echo: pending    -------------------------------------------------------------------------------------------

## 2025-06-29 NOTE — ED ADULT NURSE REASSESSMENT NOTE - NS ED NURSE REASSESS COMMENT FT1
Upon attempting to transport patient to CT, patient found to be hypotensive. [place] PA aware of BP of 92/53, with repeat 89/62(71). LR infusing at 100ml/hr. Patient reporting some dizziness. PA to come to bedside to evaluate. Placed back on NRB. Primary RN Adrienne made aware.

## 2025-06-29 NOTE — ED ADULT NURSE REASSESSMENT NOTE - NS ED NURSE REASSESS COMMENT FT1
Heparin bolus and infusion initiated at 11mL/hr as per orders based off Heparin nomogram. Second IV access in place. Second RN present to confirm correct medication administration. CBC/coags due at 0937. Safety maintained, patient aware of ongoing plan of care.

## 2025-06-29 NOTE — CONSULT NOTE ADULT - ASSESSMENT
55M p/w L abdominal pain and hypoxia found to hav a 4mm calculus in mid left ureter with mild to moderate HUN for which  was consulted for. Currently on heparin gtt for concern for PE. Pt is afebrile with soft BP 89/64, O2: 88 on HFNC.  WBC: 9.91 Cr: 1.38 (unsure baseline) UA pending. CTAP shows 4 mm calculus within the mid left ureter with associated mild to  moderate left renal hydroureteronephrosis.    55M p/w L abdominal pain and hypoxia found to hav a 4mm calculus in mid left ureter with mild to moderate HUN for which  was consulted for. Currently on heparin gtt for concern for PE. Pt is afebrile with soft BP 89/64, O2: 88 on HFNC.  WBC: 9.91 Cr: 1.38 (unsure baseline) UA pending. CTAP shows 4 mm calculus within the mid left ureter with associated mild to  moderate left renal hydroureteronephrosis.     Recommendations  - Would recommend medicine consult for PE  -Trial of medical expulsive therapy with PO pain control  -Flomax 0.4mg daily x30 days  -Prn Pain: Toradol and Oxycodone  -Zofran PRN nausea  -Senna, colace, miralax  -Aggressive hydration  -Strain urine for calculi  -Return to ER for any fever > 100.4, pain uncontrolled on discharge pain medications, or inability to tolerate PO  -Follow up with Dr. Tate  in clinic [phone].     [facility]  [address] [place] [address] [phone]      55M p/w L abdominal pain and hypoxia found to hav a 4mm calculus in mid left ureter with mild to moderate HUN for which  was consulted for. Currently on heparin gtt for concern for PE. Pt is afebrile with soft BP 89/64, O2: 88 on HFNC.  WBC: 9.91 Cr: 1.38 (unsure baseline) UA pending. CTAP shows 4 mm calculus within the mid left ureter with associated mild to  moderate left renal hydroureteronephrosis.     Recommendations  - Would recommend medicine consult for PE  -Trial of medical expulsive therapy with PO pain control  -Flomax 0.4mg daily x30 days  -Prn Pain: Toradol and Oxycodone  -Zofran PRN nausea  -Senna, colace, miralax  -Aggressive hydration  -Strain urine for calculi  -Return to ER for any fever > 100.4, pain uncontrolled on discharge pain medications, or inability to tolerate PO  -Follow up with Dr. Tate or Hoenig as outpatient [phone].     [facility]  [address] [place] [address] [phone]

## 2025-06-29 NOTE — PATIENT PROFILE ADULT - FALL HARM RISK - RISK INTERVENTIONS

## 2025-06-29 NOTE — H&P ADULT - PROBLEM SELECTOR PLAN 4
UA with microscopic hematuria, CT finding of 4mm ureter stone with associated L sided hydronephrosis  - suspect passing kidney stone causing severe left sided abdominal pain  - Urology consult appreciated  - IVF and pain control, strain urine for stone  - will monitor renal function

## 2025-06-29 NOTE — ED ADULT NURSE REASSESSMENT NOTE - NS ED NURSE REASSESS COMMENT FT1
As per MD, Heparin discontinued. Patient placed in gown, side rails up for safety, bed in lowest position, advised to ask RN if assistance is needed, patient and family educated on plan of care, comfort and safety provided.

## 2025-06-29 NOTE — ED ADULT NURSE REASSESSMENT NOTE - NS ED NURSE REASSESS COMMENT FT1
transporter at bedside to take pt to vascular. Pt currently on high flow. Rn called and notified vascular. As per vascular, test will be performed at bedside when pt is upstairs in bed
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
## 2025-06-29 NOTE — CHART NOTE - NSCHARTNOTEFT_GEN_A_CORE
patient continues to be on HFNC     -Repeat CTA to rule out PE since kidney function is improving  -f/u potassium likely falsely elevated due to hemolysis  -f/u cardio and urology recs   -rest of managemet per h and p     d/w acp in ED patient continues to be on HFNC     -Repeat CTA to rule out PE since kidney function is improving  -f/u potassium likely falsely elevated due to hemolysis  -f/u cardio and urology recs   -if further desats will consider consulting MICU vs pulmonary  -rest of managemet per h and p     d/w acp in ED

## 2025-06-29 NOTE — H&P ADULT - TIME BILLING
reviewing prior documentation, reviewing available recent outpatient records, independently obtaining a history and interpreting results of tests, performing a physical examination, reviewing tests/imaging, discussing the plan with the patient, ordering medications/tests, documenting clinical information in the electronic health record, and coordinating care.  The time spent during this encounter (80 minutes) excludes teaching and separately reported services.

## 2025-06-29 NOTE — CONSULT NOTE ADULT - SUBJECTIVE AND OBJECTIVE BOX
HPI:  55M p/w L abdominal pain and hypoxia found to hav a 4mm calculus in mid left ureter with mild to moderate HUN for which  was consulted for. Endorses prior of stones, has a private urologist in [place] not affiliated with [facility].  Endorses left flank pain, vomiting. no issues with urination, no fevers, chills, UTI symptoms.   Currently pain is better controlled.     PAST MEDICAL & SURGICAL HISTORY:  No pertinent past medical history      No significant past surgical history        FAMILY HISTORY:   No known  malignancy   SOCIAL HISTORY: Retired   Tobacco hx: smoker/nonsmoker   MEDICATIONS  (STANDING):  heparin  Infusion.  Unit(s)/Hr (11 mL/Hr) IV Continuous <Continuous>    MEDICATIONS  (PRN):  heparin   Injectable 5000 Unit(s) IV Push every 6 hours PRN For aPTT less than 40  heparin   Injectable 2500 Unit(s) IV Push every 6 hours PRN For aPTT between 40 - 57    Allergies    No Known Allergies    Intolerances        REVIEW OF SYSTEMS: Pertinent positives and negatives as stated in HPI, otherwise negative    Vital signs  T(C): 36.7 (06-28-25 @ 22:25), Max: 36.8 (06-28-25 @ 21:48)  HR: 69 (06-29-25 @ 03:52)  BP: 89/64 (06-29-25 @ 03:52)  SpO2: 88% (06-29-25 @ 03:52)  Wt(kg): --    Physical Exam  Gen: NAD  HEENT: normocephalic, atraumatic, no scleral icterus  Pulm:  No respiratory distress, no subcostal retractions, no accessory muscle use   CV:  no JVD  Abd: Soft, NT, ND, no rebound tenderness or guarding  MSK:  No CVAT, Moving all extremities, full ROM in all extremities, No edema present  NEURO: A&Ox3, no focal neurological deficits, CN 2-12 grossly intact  SKIN: warm, dry, no rash.    LABS:        06-28 @ 22:44    WBC 9.91  / Hct 52.7  / SCr 1.38     06-28    140  |  106  |  19  ----------------------------<  156[H]  4.0   |  17[L]  |  1.38[H]    Ca    9.5      28 Jun 2025 22:44  Mg     2.2     06-28    TPro  7.0  /  Alb  4.3  /  TBili  0.8  /  DBili  x   /  AST  31  /  ALT  34  /  AlkPhos  96  06-28    PT/INR - ( 28 Jun 2025 22:44 )   PT: 12.0 sec;   INR: 1.05 ratio         PTT - ( 28 Jun 2025 22:44 )  PTT:28.6 sec  Urinalysis Basic - ( 28 Jun 2025 22:44 )    Color: x / Appearance: x / SG: x / pH: x  Gluc: 156 mg/dL / Ketone: x  / Bili: x / Urobili: x   Blood: x / Protein: x / Nitrite: x   Leuk Esterase: x / RBC: x / WBC x   Sq Epi: x / Non Sq Epi: x / Bacteria: x        Urine Cx:   Blood Cx:    Radiology:< from: CT Abdomen and Pelvis w/ IV Cont (06.29.25 @ 00:09) >  ACC: [id number] EXAM:  CT ANGIO CHEST PULM ART WAWI   ORDERED BY:  CHIOMA YODER     PROCEDURE DATE:  06/29/2025          INTERPRETATION:  CLINICAL INFORMATION: Hypoxia and shortness of breath,   abdominal pain    COMPARISON: None.    CONTRAST/COMPLICATIONS:  IV Contrast: Omnipaque 350  90 cc administered   10 cc discarded  Oral Contrast: NONE.    PROCEDURE:  CT of the Chest, Abdomen and Pelvis was performed.  Sagittal and coronal reformats were performed.    FINDINGS:  CHEST:  LUNGS AND LARGE AIRWAYS: Patent central airways. Emphysema. Scattered   bilateral pulmonary nodules measuring up to 5 mm in the left upper lobe   (301:73). Right lower lobe calcified cranial. Bibasilar atelectasis.  PLEURA: No pleural effusion.  VESSELS: Mixing artifact throughout the right and left main pulmonary   artery and distal vessels Limited evaluation for pulmonary embolism. No   large saddle pulmonary embolus. Aortic calcifications. Coronary artery   calcifications. Main pulmonary artery measures 4.2 cm.  HEART: Enlarged right atrium and right ventricle. No pericardial effusion.  MEDIASTINUM AND JOHN: No lymphadenopathy.  CHEST WALL AND LOWER NECK: Within normal limits.    ABDOMEN AND PELVIS:  LIVER: Scattered subcentimeter low attenuating foci throughout the liver   are too small to characterize.  BILE DUCTS: Normal caliber.  GALLBLADDER: Within normal limits.  SPLEEN: Within normal limits.  PANCREAS: Within normal limits.  ADRENALS: Within normal limits.  KIDNEYS/URETERS: A 4 mm stone within the left mid ureter with mild to   moderate upstream hydronephrosis. Left kidney is unremarkable..    BLADDER: Within normal limits.  REPRODUCTIVE ORGANS: Prostate is enlarged.    BOWEL: No bowel obstruction. Colonic diverticulosis. Appendix is normal.  PERITONEUM/RETROPERITONEUM: Within normal limits.  VESSELS: Atherosclerotic changes. Contrast regurgitation into the IVC and   renal veins.  LYMPH NODES: No lymphadenopathy.  ABDOMINAL WALL: Within normal limits.  BONES: Mild degenerative changes.    IMPRESSION:  Suboptimal opacification of the peripheral arterial vasculature. The area   of concern within the periphery of the right lower lobe lobar artery,   (301image 69) is felt to be related to mixing artifact.  (If clinically warranted this study can be repeated if necessary).    A 4 mm calculus within the mid left ureter with associated mild to   moderate left renal hydroureteronephrosis.    --- End of Report ---    < end of copied text >

## 2025-06-29 NOTE — ED ADULT NURSE REASSESSMENT NOTE - NS ED NURSE REASSESS COMMENT FT1
Received Awake Pt states " I feel better." Alert No lethargy noted 02 sat 94-95 % High flow oxygen Admitted Tele RSR On CM Denies pain .

## 2025-06-29 NOTE — H&P ADULT - PROBLEM SELECTOR PLAN 3
- nutrition consult  - concern for occult malignancy with recent weight loss, significant smoking history and scattered lung nodules up to 5mm.  Other age appropriate cancer screening - colonoscopy this year s/p polypectomy (benign per patient), unsure if PSA was checked, but denies urinary symptoms  - will consider Pulmonary consult

## 2025-06-29 NOTE — H&P ADULT - NSHPPHYSICALEXAM_GEN_ALL_CORE
T(C): 36.6 (29 Jun 2025 05:43), Max: 36.8 (28 Jun 2025 21:48)  T(F): 97.9 (29 Jun 2025 05:43), Max: 98.3 (28 Jun 2025 21:48)  HR: 55 (29 Jun 2025 05:43) (54 - 76)  BP: 109/77 (29 Jun 2025 05:43) (89/64 - 129/93)  RR: 24 (29 Jun 2025 05:43) (17 - 24)  SpO2: 95% (29 Jun 2025 05:43) (88% - 95%): nasal cannula, high flow O2 Flow (L/min): 50 O2 Concentration (%): 50

## 2025-06-29 NOTE — H&P ADULT - NSHPSOCIALHISTORY_GEN_ALL_CORE
former 35+ pack year smoker, quit a month ago  denies regular alcohol use  , lives with wife and children  works in many various business ventures (banking, restaurant and construction/real estate)  independent

## 2025-06-30 LAB
ANION GAP SERPL CALC-SCNC: 12 MMOL/L — SIGNIFICANT CHANGE UP (ref 5–17)
BUN SERPL-MCNC: 16 MG/DL — SIGNIFICANT CHANGE UP (ref 7–23)
CALCIUM SERPL-MCNC: 8.6 MG/DL — SIGNIFICANT CHANGE UP (ref 8.4–10.5)
CHLORIDE SERPL-SCNC: 107 MMOL/L — SIGNIFICANT CHANGE UP (ref 96–108)
CO2 SERPL-SCNC: 18 MMOL/L — LOW (ref 22–31)
CREAT SERPL-MCNC: 1.26 MG/DL — SIGNIFICANT CHANGE UP (ref 0.5–1.3)
EGFR: 67 ML/MIN/1.73M2 — SIGNIFICANT CHANGE UP
EGFR: 67 ML/MIN/1.73M2 — SIGNIFICANT CHANGE UP
GLUCOSE SERPL-MCNC: 103 MG/DL — HIGH (ref 70–99)
HCT VFR BLD CALC: 47.1 % — SIGNIFICANT CHANGE UP (ref 39–50)
HGB BLD-MCNC: 15.8 G/DL — SIGNIFICANT CHANGE UP (ref 13–17)
MCHC RBC-ENTMCNC: 30.9 PG — SIGNIFICANT CHANGE UP (ref 27–34)
MCHC RBC-ENTMCNC: 33.5 G/DL — SIGNIFICANT CHANGE UP (ref 32–36)
MCV RBC AUTO: 92 FL — SIGNIFICANT CHANGE UP (ref 80–100)
NRBC # BLD AUTO: 0 K/UL — SIGNIFICANT CHANGE UP (ref 0–0)
NRBC # FLD: 0 K/UL — SIGNIFICANT CHANGE UP (ref 0–0)
NRBC BLD AUTO-RTO: 0 /100 WBCS — SIGNIFICANT CHANGE UP (ref 0–0)
PLATELET # BLD AUTO: 144 K/UL — LOW (ref 150–400)
PMV BLD: 12.4 FL — SIGNIFICANT CHANGE UP (ref 7–13)
POTASSIUM SERPL-MCNC: 4.1 MMOL/L — SIGNIFICANT CHANGE UP (ref 3.5–5.3)
POTASSIUM SERPL-SCNC: 4.1 MMOL/L — SIGNIFICANT CHANGE UP (ref 3.5–5.3)
PSA FLD-MCNC: 2.52 NG/ML — SIGNIFICANT CHANGE UP (ref 0–4)
RBC # BLD: 5.12 M/UL — SIGNIFICANT CHANGE UP (ref 4.2–5.8)
RBC # FLD: 14.1 % — SIGNIFICANT CHANGE UP (ref 10.3–14.5)
SODIUM SERPL-SCNC: 137 MMOL/L — SIGNIFICANT CHANGE UP (ref 135–145)
WBC # BLD: 13.99 K/UL — HIGH (ref 3.8–10.5)
WBC # FLD AUTO: 13.99 K/UL — HIGH (ref 3.8–10.5)

## 2025-06-30 PROCEDURE — 85018 HEMOGLOBIN: CPT

## 2025-06-30 PROCEDURE — 85027 COMPLETE CBC AUTOMATED: CPT

## 2025-06-30 PROCEDURE — 82947 ASSAY GLUCOSE BLOOD QUANT: CPT

## 2025-06-30 PROCEDURE — 82330 ASSAY OF CALCIUM: CPT

## 2025-06-30 PROCEDURE — 85379 FIBRIN DEGRADATION QUANT: CPT

## 2025-06-30 PROCEDURE — 83690 ASSAY OF LIPASE: CPT

## 2025-06-30 PROCEDURE — 80048 BASIC METABOLIC PNL TOTAL CA: CPT

## 2025-06-30 PROCEDURE — 74177 CT ABD & PELVIS W/CONTRAST: CPT

## 2025-06-30 PROCEDURE — 86900 BLOOD TYPING SEROLOGIC ABO: CPT

## 2025-06-30 PROCEDURE — 85025 COMPLETE CBC W/AUTO DIFF WBC: CPT

## 2025-06-30 PROCEDURE — 93005 ELECTROCARDIOGRAM TRACING: CPT

## 2025-06-30 PROCEDURE — 82803 BLOOD GASES ANY COMBINATION: CPT

## 2025-06-30 PROCEDURE — 82435 ASSAY OF BLOOD CHLORIDE: CPT

## 2025-06-30 PROCEDURE — 81001 URINALYSIS AUTO W/SCOPE: CPT

## 2025-06-30 PROCEDURE — 86850 RBC ANTIBODY SCREEN: CPT

## 2025-06-30 PROCEDURE — 84295 ASSAY OF SERUM SODIUM: CPT

## 2025-06-30 PROCEDURE — 83735 ASSAY OF MAGNESIUM: CPT

## 2025-06-30 PROCEDURE — 93970 EXTREMITY STUDY: CPT

## 2025-06-30 PROCEDURE — 99233 SBSQ HOSP IP/OBS HIGH 50: CPT

## 2025-06-30 PROCEDURE — 86901 BLOOD TYPING SEROLOGIC RH(D): CPT

## 2025-06-30 PROCEDURE — 71275 CT ANGIOGRAPHY CHEST: CPT

## 2025-06-30 PROCEDURE — 80053 COMPREHEN METABOLIC PANEL: CPT

## 2025-06-30 PROCEDURE — 85730 THROMBOPLASTIN TIME PARTIAL: CPT

## 2025-06-30 PROCEDURE — 83605 ASSAY OF LACTIC ACID: CPT

## 2025-06-30 PROCEDURE — 83880 ASSAY OF NATRIURETIC PEPTIDE: CPT

## 2025-06-30 PROCEDURE — 84484 ASSAY OF TROPONIN QUANT: CPT

## 2025-06-30 PROCEDURE — 93880 EXTRACRANIAL BILAT STUDY: CPT

## 2025-06-30 PROCEDURE — 85014 HEMATOCRIT: CPT

## 2025-06-30 PROCEDURE — 85610 PROTHROMBIN TIME: CPT

## 2025-06-30 PROCEDURE — G0103: CPT

## 2025-06-30 PROCEDURE — 84132 ASSAY OF SERUM POTASSIUM: CPT

## 2025-06-30 PROCEDURE — 71045 X-RAY EXAM CHEST 1 VIEW: CPT

## 2025-06-30 RX ORDER — ACETAMINOPHEN 500 MG/5ML
1000 LIQUID (ML) ORAL ONCE
Refills: 0 | Status: COMPLETED | OUTPATIENT
Start: 2025-06-30 | End: 2025-06-30

## 2025-06-30 RX ORDER — LIDOCAINE HYDROCHLORIDE 20 MG/ML
1 JELLY TOPICAL ONCE
Refills: 0 | Status: COMPLETED | OUTPATIENT
Start: 2025-06-30 | End: 2025-06-30

## 2025-06-30 RX ORDER — HYDROMORPHONE/SOD CHLOR,ISO/PF 2 MG/10 ML
0.4 SYRINGE (ML) INJECTION ONCE
Refills: 0 | Status: DISCONTINUED | OUTPATIENT
Start: 2025-06-30 | End: 2025-06-30

## 2025-06-30 RX ORDER — HYDROMORPHONE/SOD CHLOR,ISO/PF 2 MG/10 ML
0.2 SYRINGE (ML) INJECTION ONCE
Refills: 0 | Status: DISCONTINUED | OUTPATIENT
Start: 2025-06-30 | End: 2025-06-30

## 2025-06-30 RX ORDER — SODIUM CHLORIDE 9 G/1000ML
500 INJECTION, SOLUTION INTRAVENOUS ONCE
Refills: 0 | Status: COMPLETED | OUTPATIENT
Start: 2025-06-30 | End: 2025-06-30

## 2025-06-30 RX ADMIN — Medication 4 MILLIGRAM(S): at 08:55

## 2025-06-30 RX ADMIN — TAMSULOSIN HYDROCHLORIDE 0.4 MILLIGRAM(S): 0.4 CAPSULE ORAL at 21:41

## 2025-06-30 RX ADMIN — Medication 1000 MILLIGRAM(S): at 04:00

## 2025-06-30 RX ADMIN — SODIUM CHLORIDE 1000 MILLILITER(S): 9 INJECTION, SOLUTION INTRAVENOUS at 07:13

## 2025-06-30 RX ADMIN — Medication 4 MILLIGRAM(S): at 07:56

## 2025-06-30 RX ADMIN — ENOXAPARIN SODIUM 40 MILLIGRAM(S): 100 INJECTION SUBCUTANEOUS at 21:41

## 2025-06-30 RX ADMIN — Medication 400 MILLIGRAM(S): at 03:39

## 2025-06-30 RX ADMIN — SODIUM CHLORIDE 100 MILLILITER(S): 9 INJECTION, SOLUTION INTRAVENOUS at 21:42

## 2025-06-30 RX ADMIN — Medication 0.4 MILLIGRAM(S): at 09:20

## 2025-06-30 RX ADMIN — Medication 0.2 MILLIGRAM(S): at 05:58

## 2025-06-30 RX ADMIN — Medication 0.2 MILLIGRAM(S): at 06:20

## 2025-06-30 NOTE — PROGRESS NOTE ADULT - SUBJECTIVE AND OBJECTIVE BOX
Patient is a 55y old  Male who presents with a chief complaint of abd pain (30 Jun 2025 11:55)      SUBJECTIVE / OVERNIGHT EVENTS:  Pt seen and examined. No acute events overnight. Denies CP, SOB. Sating 93% on HFNC.    MEDICATIONS  (STANDING):  enoxaparin Injectable 40 milliGRAM(s) SubCutaneous every 24 hours  lactated ringers. 1000 milliLiter(s) (100 mL/Hr) IV Continuous <Continuous>  tamsulosin 0.4 milliGRAM(s) Oral at bedtime    MEDICATIONS  (PRN):  acetaminophen     Tablet .. 975 milliGRAM(s) Oral every 8 hours PRN Temp greater or equal to 38C (100.4F), Mild Pain (1 - 3)      Vital Signs Last 24 Hrs  T(C): 36.7 (30 Jun 2025 20:40), Max: 36.7 (30 Jun 2025 20:40)  T(F): 98 (30 Jun 2025 20:40), Max: 98 (30 Jun 2025 20:40)  HR: 84 (30 Jun 2025 20:40) (50 - 84)  BP: 94/60 (30 Jun 2025 20:40) (92/58 - 109/71)  BP(mean): --  RR: 20 (30 Jun 2025 20:40) (18 - 20)  SpO2: 93% (30 Jun 2025 20:40) (91% - 96%)    Parameters below as of 30 Jun 2025 20:40  Patient On (Oxygen Delivery Method): nasal cannula, high flow      CAPILLARY BLOOD GLUCOSE        I&O's Summary    29 Jun 2025 07:01  -  30 Jun 2025 07:00  --------------------------------------------------------  IN: 120 mL / OUT: 650 mL / NET: -530 mL    30 Jun 2025 07:01  -  30 Jun 2025 21:15  --------------------------------------------------------  IN: 0 mL / OUT: 300 mL / NET: -300 mL        PHYSICAL EXAM:  GENERAL: NAD, well-groomed  HEAD:  Atraumatic, Normocephalic  EYES: EOMI, PERRLA, conjunctiva and sclera clear  NECK: Supple, No JVD  CHEST/LUNG: Clear to auscultation bilaterally; No wheeze  HEART: Regular rate and rhythm; No murmurs, rubs, or gallops  ABDOMEN: Soft, Nontender, Nondistended; Bowel sounds present  EXTREMITIES:  2+ Peripheral Pulses, No clubbing, cyanosis, or edema  PSYCH: AAOx3  NEUROLOGY: non-focal  SKIN: No rashes or lesions    LABS:                        15.8   13.99 )-----------( 144      ( 30 Jun 2025 10:20 )             47.1     06-30    137  |  107  |  16  ----------------------------<  103[H]  4.1   |  18[L]  |  1.26    Ca    8.6      30 Jun 2025 10:20  Mg     2.2     06-28    TPro  7.0  /  Alb  4.3  /  TBili  0.8  /  DBili  x   /  AST  31  /  ALT  34  /  AlkPhos  96  06-28    PT/INR - ( 28 Jun 2025 22:44 )   PT: 12.0 sec;   INR: 1.05 ratio         PTT - ( 29 Jun 2025 12:33 )  PTT:28.1 sec      Urinalysis Basic - ( 30 Jun 2025 10:20 )    Color: x / Appearance: x / SG: x / pH: x  Gluc: 103 mg/dL / Ketone: x  / Bili: x / Urobili: x   Blood: x / Protein: x / Nitrite: x   Leuk Esterase: x / RBC: x / WBC x   Sq Epi: x / Non Sq Epi: x / Bacteria: x        RADIOLOGY & ADDITIONAL TESTS:    Imaging Personally Reviewed:    Consultant(s) Notes Reviewed:      Care Discussed with Consultants/Other Providers:
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
## 2025-06-30 NOTE — PHARMACOTHERAPY INTERVENTION NOTE - COMMENTS
Performed medication reconciliation and home medication list updated in prescription writer/ outpatient medication review. Medications verified with patient and pharmacy.     Home medications:  Pt is not taking any medications at home.     Melani Reich PharmD  Transition of Care Pharmacist  Available on Microsoft Teams (preferred)

## 2025-07-01 LAB
HCT VFR BLD CALC: 43.7 % — SIGNIFICANT CHANGE UP (ref 39–50)
HGB BLD-MCNC: 14.8 G/DL — SIGNIFICANT CHANGE UP (ref 13–17)
MCHC RBC-ENTMCNC: 30.9 PG — SIGNIFICANT CHANGE UP (ref 27–34)
MCHC RBC-ENTMCNC: 33.9 G/DL — SIGNIFICANT CHANGE UP (ref 32–36)
MCV RBC AUTO: 91.2 FL — SIGNIFICANT CHANGE UP (ref 80–100)
NRBC # BLD AUTO: 0 K/UL — SIGNIFICANT CHANGE UP (ref 0–0)
NRBC # FLD: 0 K/UL — SIGNIFICANT CHANGE UP (ref 0–0)
NRBC BLD AUTO-RTO: 0 /100 WBCS — SIGNIFICANT CHANGE UP (ref 0–0)
PLATELET # BLD AUTO: 127 K/UL — LOW (ref 150–400)
PMV BLD: 12.5 FL — SIGNIFICANT CHANGE UP (ref 7–13)
RBC # BLD: 4.79 M/UL — SIGNIFICANT CHANGE UP (ref 4.2–5.8)
RBC # FLD: 13.6 % — SIGNIFICANT CHANGE UP (ref 10.3–14.5)
WBC # BLD: 11.62 K/UL — HIGH (ref 3.8–10.5)
WBC # FLD AUTO: 11.62 K/UL — HIGH (ref 3.8–10.5)

## 2025-07-01 PROCEDURE — 86900 BLOOD TYPING SEROLOGIC ABO: CPT

## 2025-07-01 PROCEDURE — G0103: CPT

## 2025-07-01 PROCEDURE — 93970 EXTREMITY STUDY: CPT

## 2025-07-01 PROCEDURE — 80048 BASIC METABOLIC PNL TOTAL CA: CPT

## 2025-07-01 PROCEDURE — 71045 X-RAY EXAM CHEST 1 VIEW: CPT

## 2025-07-01 PROCEDURE — 85027 COMPLETE CBC AUTOMATED: CPT

## 2025-07-01 PROCEDURE — 76770 US EXAM ABDO BACK WALL COMP: CPT | Mod: 26

## 2025-07-01 PROCEDURE — 86901 BLOOD TYPING SEROLOGIC RH(D): CPT

## 2025-07-01 PROCEDURE — 74177 CT ABD & PELVIS W/CONTRAST: CPT

## 2025-07-01 PROCEDURE — 99233 SBSQ HOSP IP/OBS HIGH 50: CPT

## 2025-07-01 PROCEDURE — 83735 ASSAY OF MAGNESIUM: CPT

## 2025-07-01 PROCEDURE — 70450 CT HEAD/BRAIN W/O DYE: CPT | Mod: 26

## 2025-07-01 PROCEDURE — 84132 ASSAY OF SERUM POTASSIUM: CPT

## 2025-07-01 PROCEDURE — 82803 BLOOD GASES ANY COMBINATION: CPT

## 2025-07-01 PROCEDURE — 93880 EXTRACRANIAL BILAT STUDY: CPT

## 2025-07-01 PROCEDURE — 71275 CT ANGIOGRAPHY CHEST: CPT

## 2025-07-01 PROCEDURE — 85379 FIBRIN DEGRADATION QUANT: CPT

## 2025-07-01 PROCEDURE — 82435 ASSAY OF BLOOD CHLORIDE: CPT

## 2025-07-01 PROCEDURE — 83690 ASSAY OF LIPASE: CPT

## 2025-07-01 PROCEDURE — 84295 ASSAY OF SERUM SODIUM: CPT

## 2025-07-01 PROCEDURE — 84484 ASSAY OF TROPONIN QUANT: CPT

## 2025-07-01 PROCEDURE — 83880 ASSAY OF NATRIURETIC PEPTIDE: CPT

## 2025-07-01 PROCEDURE — 76770 US EXAM ABDO BACK WALL COMP: CPT

## 2025-07-01 PROCEDURE — 93005 ELECTROCARDIOGRAM TRACING: CPT

## 2025-07-01 PROCEDURE — 85018 HEMOGLOBIN: CPT

## 2025-07-01 PROCEDURE — 99223 1ST HOSP IP/OBS HIGH 75: CPT

## 2025-07-01 PROCEDURE — 85730 THROMBOPLASTIN TIME PARTIAL: CPT

## 2025-07-01 PROCEDURE — 85610 PROTHROMBIN TIME: CPT

## 2025-07-01 PROCEDURE — 80053 COMPREHEN METABOLIC PANEL: CPT

## 2025-07-01 PROCEDURE — 70450 CT HEAD/BRAIN W/O DYE: CPT

## 2025-07-01 PROCEDURE — 85025 COMPLETE CBC W/AUTO DIFF WBC: CPT

## 2025-07-01 PROCEDURE — 83605 ASSAY OF LACTIC ACID: CPT

## 2025-07-01 PROCEDURE — 82330 ASSAY OF CALCIUM: CPT

## 2025-07-01 PROCEDURE — 81001 URINALYSIS AUTO W/SCOPE: CPT

## 2025-07-01 PROCEDURE — 86850 RBC ANTIBODY SCREEN: CPT

## 2025-07-01 PROCEDURE — 82947 ASSAY GLUCOSE BLOOD QUANT: CPT

## 2025-07-01 PROCEDURE — 85014 HEMATOCRIT: CPT

## 2025-07-01 RX ORDER — HYDROMORPHONE/SOD CHLOR,ISO/PF 2 MG/10 ML
0.3 SYRINGE (ML) INJECTION ONCE
Refills: 0 | Status: DISCONTINUED | OUTPATIENT
Start: 2025-07-01 | End: 2025-07-01

## 2025-07-01 RX ORDER — HYDROMORPHONE/SOD CHLOR,ISO/PF 2 MG/10 ML
0.2 SYRINGE (ML) INJECTION ONCE
Refills: 0 | Status: DISCONTINUED | OUTPATIENT
Start: 2025-07-01 | End: 2025-07-01

## 2025-07-01 RX ORDER — OXYCODONE HYDROCHLORIDE 30 MG/1
5 TABLET ORAL EVERY 6 HOURS
Refills: 0 | Status: DISCONTINUED | OUTPATIENT
Start: 2025-07-01 | End: 2025-07-08

## 2025-07-01 RX ORDER — HYDROMORPHONE/SOD CHLOR,ISO/PF 2 MG/10 ML
0.4 SYRINGE (ML) INJECTION EVERY 6 HOURS
Refills: 0 | Status: DISCONTINUED | OUTPATIENT
Start: 2025-07-01 | End: 2025-07-01

## 2025-07-01 RX ADMIN — Medication 0.3 MILLIGRAM(S): at 01:25

## 2025-07-01 RX ADMIN — Medication 975 MILLIGRAM(S): at 21:29

## 2025-07-01 RX ADMIN — Medication 0.3 MILLIGRAM(S): at 01:55

## 2025-07-01 RX ADMIN — Medication 0.2 MILLIGRAM(S): at 00:49

## 2025-07-01 RX ADMIN — TAMSULOSIN HYDROCHLORIDE 0.4 MILLIGRAM(S): 0.4 CAPSULE ORAL at 21:29

## 2025-07-01 RX ADMIN — Medication 0.2 MILLIGRAM(S): at 01:19

## 2025-07-01 RX ADMIN — ENOXAPARIN SODIUM 40 MILLIGRAM(S): 100 INJECTION SUBCUTANEOUS at 21:29

## 2025-07-01 RX ADMIN — Medication 975 MILLIGRAM(S): at 21:59
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
## 2025-07-01 NOTE — DIETITIAN INITIAL EVALUATION ADULT - ADD RECOMMEND
consider need for bowel regimen-no BM since PTA as per pt  consider need for anti-nausea meds-pt c/o of N/V related to kidney stones

## 2025-07-01 NOTE — CONSULT NOTE ADULT - ASSESSMENT
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 CTA negative for PE, but shows Suboptimal opacification of the peripheral arterial vasculature. The area of concern within the periphery of the right lower lobe lobar artery,   (301 image 69). A 4 mm calculus within the mid left ureter with associated mild to moderate left renal hydroureteronephrosis. Presentation concerning for pHTN of unclear etiology at this time. Patient's syncopal episodes could to due to pTHN.     #acute hypoxic respiratory failure  #history of smoking (quit 2 months ago)  #right heart enlargement   #syncope   #pulmonary hypertension  - HIV, hepatitis serologies  - KELLY, anti-centromere ab, anti topoisomerase I, anti RNP, ESR, CRP, anti dsDNA, anti CCP, RF  - pro-BNP   - TTE w/ bubble study  - V/Q scan  - eval for R heart cath- would recommend shunt fraction  - appropriate bowel reg to prevent valsalva   - outpatient f/u for PFTs, sleep study  - optimize pain control

## 2025-07-01 NOTE — DIETITIAN INITIAL EVALUATION ADULT - NSFNSGIIOFT_GEN_A_CORE
last BM PTA as per pt-consider need for bowel regimen Griseofulvin Pregnancy And Lactation Text: This medication is Pregnancy Category X and is known to cause serious birth defects. It is unknown if this medication is excreted in breast milk but breast feeding should be avoided.

## 2025-07-01 NOTE — PROGRESS NOTE ADULT - ASSESSMENT
55 year-old male with extensive smoking history (35+ pack year, quit one month ago), kidney stone presents with severe left sided constant abdominal and hypoxic respiratory failure of unclear etiology.

## 2025-07-01 NOTE — DIETITIAN INITIAL EVALUATION ADULT - PROBLEM SELECTOR PLAN 1
H/H 18/53, CTA suboptimal study for PE but showed emphysema - suspect chronic hypoxia leading to erythrocytosis with history of extensive smoking (35+ years, quit 1 month ago) consistent with symptoms of progressive DAVIS for the past year and recent TTE showing LIUDMILA/RVE (likely pulm HTN as well)  - will need to r/o PE with repeat CTA pending serum creatinine this am, will check LE doppler r/o DVT  - will repeat TTE to evaluate structural heart disease, LV EF% and pulm HTN  - will eventually need KEKE to r/o sinus venous ASD  - Cardiology consult appreciated  - s/p solumedrol 40 mg iv one dose in ED, will consider Pulmonary consult of emphysema/hypoxia  - continue Hi flow NC, taper as tolerated
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
## 2025-07-01 NOTE — PROGRESS NOTE ADULT - SUBJECTIVE AND OBJECTIVE BOX
PROGRESS NOTE:   Myra Garcia, DO  Hospitalist  Teams  After 5pm/weekends or if no answer ext: [phone]      Patient is a 55y old  Male who presents with a chief complaint of abd pain (01 Jul 2025 10:01)      SUBJECTIVE / OVERNIGHT EVENTS:  Patient ripping off O2 this morning saying that his pain not well controlled over night. He is confused and hypoxic.  Nurse and I convinced him to put o2 back on and he became more calm and able to speak reasonably after O2 improved.   Pain is in the L side, but better now.  On RA he went as low as 69% O2 saturation  ADDITIONAL REVIEW OF SYSTEMS:  No fever or chills no n/v/d  MEDICATIONS  (STANDING):  enoxaparin Injectable 40 milliGRAM(s) SubCutaneous every 24 hours  lactated ringers. 1000 milliLiter(s) (100 mL/Hr) IV Continuous <Continuous>  tamsulosin 0.4 milliGRAM(s) Oral at bedtime    MEDICATIONS  (PRN):  acetaminophen     Tablet .. 975 milliGRAM(s) Oral every 8 hours PRN Temp greater or equal to 38C (100.4F), Mild Pain (1 - 3)      CAPILLARY BLOOD GLUCOSE        I&O's Summary    30 Jun 2025 07:01  -  01 Jul 2025 07:00  --------------------------------------------------------  IN: 1320 mL / OUT: 2100 mL / NET: -780 mL        PHYSICAL EXAM:  Vital Signs Last 24 Hrs  T(C): 36.4 (01 Jul 2025 11:44), Max: 37.1 (01 Jul 2025 00:40)  T(F): 97.5 (01 Jul 2025 11:44), Max: 98.8 (01 Jul 2025 00:40)  HR: 73 (01 Jul 2025 11:44) (66 - 90)  BP: 101/68 (01 Jul 2025 11:44) (94/60 - 101/68)  BP(mean): --  RR: 19 (01 Jul 2025 11:44) (18 - 20)  SpO2: 93% (01 Jul 2025 11:44) (91% - 97%)    Parameters below as of 01 Jul 2025 11:44  Patient On (Oxygen Delivery Method): nasal cannula, high flow        CONSTITUTIONAL: NAD, well-developed  RESPIRATORY: No wheezing, poor air movement  MUSCLOSKELETAL: no clubbing or cyanosis of digits; no joint swelling or tenderness to palpation  PSYCH: A+O to person, place, and time; affect appropriate    LABS:                        14.8   11.62 )-----------( 127      ( 01 Jul 2025 06:51 )             43.7     06-30    137  |  107  |  16  ----------------------------<  103[H]  4.1   |  18[L]  |  1.26    Ca    8.6      30 Jun 2025 10:20            Urinalysis Basic - ( 30 Jun 2025 10:20 )    Color: x / Appearance: x / SG: x / pH: x  Gluc: 103 mg/dL / Ketone: x  / Bili: x / Urobili: x   Blood: x / Protein: x / Nitrite: x   Leuk Esterase: x / RBC: x / WBC x   Sq Epi: x / Non Sq Epi: x / Bacteria: x        Urinalysis with Rflx Culture (collected 29 Jun 2025 04:19)        RADIOLOGY & ADDITIONAL TESTS:  Results Reviewed:   Imaging Personally Reviewed:  Electrocardiogram Personally Reviewed:    COORDINATION OF CARE:  Care Discussed with Consultants/Other Providers [Y/N]:  Prior or Outpatient Records Reviewed [Y/N]:

## 2025-07-01 NOTE — DIETITIAN INITIAL EVALUATION ADULT - ORAL INTAKE PTA/DIET HISTORY
consumed 3 meals daily and snacks, bagels and donuts for breakfast, a variety of foods for lunch and dinner-could not specify, snacks on crackers and nuts.

## 2025-07-01 NOTE — DIETITIAN INITIAL EVALUATION ADULT - PERTINENT MEDS FT
MEDICATIONS  (STANDING):  enoxaparin Injectable 40 milliGRAM(s) SubCutaneous every 24 hours  lactated ringers. 1000 milliLiter(s) (100 mL/Hr) IV Continuous <Continuous>  tamsulosin 0.4 milliGRAM(s) Oral at bedtime    MEDICATIONS  (PRN):  acetaminophen     Tablet .. 975 milliGRAM(s) Oral every 8 hours PRN Temp greater or equal to 38C (100.4F), Mild Pain (1 - 3)

## 2025-07-01 NOTE — DIETITIAN INITIAL EVALUATION ADULT - PERTINENT LABORATORY DATA
06-30    137  |  107  |  16  ----------------------------<  103[H]  4.1   |  18[L]  |  1.26    Ca    8.6      30 Jun 2025 10:20

## 2025-07-01 NOTE — DIETITIAN INITIAL EVALUATION ADULT - ORAL NUTRITION SUPPLEMENTS
RD added diet mighty shakes with breakfast and dinner.-monitor result of A1c and to change supplement

## 2025-07-02 ENCOUNTER — RESULT REVIEW (OUTPATIENT)
Age: 55
End: 2025-07-02

## 2025-07-02 LAB
A1AT SERPL-MCNC: 141 MG/DL — SIGNIFICANT CHANGE UP (ref 90–200)
CCP IGG SERPL-ACNC: <8 U/ML — SIGNIFICANT CHANGE UP
CENTROMERE AB SER-ACNC: <0.2 AI — SIGNIFICANT CHANGE UP
DSDNA AB SER-ACNC: <1 IU/ML — SIGNIFICANT CHANGE UP
ENA JO1 AB SER-ACNC: <0.2 AI — SIGNIFICANT CHANGE UP
ENA SCL70 AB SER-ACNC: <0.2 AI — SIGNIFICANT CHANGE UP
HAV IGM SER-ACNC: SIGNIFICANT CHANGE UP
HBV CORE IGM SER-ACNC: SIGNIFICANT CHANGE UP
HBV SURFACE AG SER-ACNC: SIGNIFICANT CHANGE UP
HCT VFR BLD CALC: 43.2 % — SIGNIFICANT CHANGE UP (ref 39–50)
HCV AB S/CO SERPL IA: 0.1 S/CO — SIGNIFICANT CHANGE UP (ref 0–0.79)
HCV AB SERPL-IMP: SIGNIFICANT CHANGE UP
HGB BLD-MCNC: 14.4 G/DL — SIGNIFICANT CHANGE UP (ref 13–17)
HIV 1+2 AB+HIV1 P24 AG SERPL QL IA: SIGNIFICANT CHANGE UP
MCHC RBC-ENTMCNC: 30.2 PG — SIGNIFICANT CHANGE UP (ref 27–34)
MCHC RBC-ENTMCNC: 33.3 G/DL — SIGNIFICANT CHANGE UP (ref 32–36)
MCV RBC AUTO: 90.6 FL — SIGNIFICANT CHANGE UP (ref 80–100)
NRBC # BLD AUTO: 0 K/UL — SIGNIFICANT CHANGE UP (ref 0–0)
NRBC # FLD: 0 K/UL — SIGNIFICANT CHANGE UP (ref 0–0)
NRBC BLD AUTO-RTO: 0 /100 WBCS — SIGNIFICANT CHANGE UP (ref 0–0)
NT-PROBNP SERPL-SCNC: 1593 PG/ML — HIGH (ref 0–300)
PLATELET # BLD AUTO: 122 K/UL — LOW (ref 150–400)
PMV BLD: 11.9 FL — SIGNIFICANT CHANGE UP (ref 7–13)
RBC # BLD: 4.77 M/UL — SIGNIFICANT CHANGE UP (ref 4.2–5.8)
RBC # FLD: 13.4 % — SIGNIFICANT CHANGE UP (ref 10.3–14.5)
RF+CCP IGG SER-IMP: NEGATIVE — SIGNIFICANT CHANGE UP
RHEUMATOID FACT SERPL-ACNC: <10 IU/ML — SIGNIFICANT CHANGE UP (ref 0–13)
WBC # BLD: 7.64 K/UL — SIGNIFICANT CHANGE UP (ref 3.8–10.5)
WBC # FLD AUTO: 7.64 K/UL — SIGNIFICANT CHANGE UP (ref 3.8–10.5)

## 2025-07-02 PROCEDURE — 71045 X-RAY EXAM CHEST 1 VIEW: CPT

## 2025-07-02 PROCEDURE — 93880 EXTRACRANIAL BILAT STUDY: CPT

## 2025-07-02 PROCEDURE — 81001 URINALYSIS AUTO W/SCOPE: CPT

## 2025-07-02 PROCEDURE — 99223 1ST HOSP IP/OBS HIGH 75: CPT

## 2025-07-02 PROCEDURE — 85610 PROTHROMBIN TIME: CPT

## 2025-07-02 PROCEDURE — 71045 X-RAY EXAM CHEST 1 VIEW: CPT | Mod: 26

## 2025-07-02 PROCEDURE — A9540: CPT

## 2025-07-02 PROCEDURE — 82947 ASSAY GLUCOSE BLOOD QUANT: CPT

## 2025-07-02 PROCEDURE — 99233 SBSQ HOSP IP/OBS HIGH 50: CPT

## 2025-07-02 PROCEDURE — 87389 HIV-1 AG W/HIV-1&-2 AB AG IA: CPT

## 2025-07-02 PROCEDURE — 80053 COMPREHEN METABOLIC PANEL: CPT

## 2025-07-02 PROCEDURE — A9567: CPT

## 2025-07-02 PROCEDURE — 85018 HEMOGLOBIN: CPT

## 2025-07-02 PROCEDURE — 83880 ASSAY OF NATRIURETIC PEPTIDE: CPT

## 2025-07-02 PROCEDURE — 86431 RHEUMATOID FACTOR QUANT: CPT

## 2025-07-02 PROCEDURE — 85025 COMPLETE CBC W/AUTO DIFF WBC: CPT

## 2025-07-02 PROCEDURE — 86850 RBC ANTIBODY SCREEN: CPT

## 2025-07-02 PROCEDURE — 93306 TTE W/DOPPLER COMPLETE: CPT | Mod: 26

## 2025-07-02 PROCEDURE — 70450 CT HEAD/BRAIN W/O DYE: CPT

## 2025-07-02 PROCEDURE — 85379 FIBRIN DEGRADATION QUANT: CPT

## 2025-07-02 PROCEDURE — 78582 LUNG VENTILAT&PERFUS IMAGING: CPT | Mod: 26

## 2025-07-02 PROCEDURE — 83690 ASSAY OF LIPASE: CPT

## 2025-07-02 PROCEDURE — 85730 THROMBOPLASTIN TIME PARTIAL: CPT

## 2025-07-02 PROCEDURE — 74177 CT ABD & PELVIS W/CONTRAST: CPT

## 2025-07-02 PROCEDURE — 86255 FLUORESCENT ANTIBODY SCREEN: CPT

## 2025-07-02 PROCEDURE — 86200 CCP ANTIBODY: CPT

## 2025-07-02 PROCEDURE — 84132 ASSAY OF SERUM POTASSIUM: CPT

## 2025-07-02 PROCEDURE — 71275 CT ANGIOGRAPHY CHEST: CPT

## 2025-07-02 PROCEDURE — 83605 ASSAY OF LACTIC ACID: CPT

## 2025-07-02 PROCEDURE — 84484 ASSAY OF TROPONIN QUANT: CPT

## 2025-07-02 PROCEDURE — 85014 HEMATOCRIT: CPT

## 2025-07-02 PROCEDURE — 82103 ALPHA-1-ANTITRYPSIN TOTAL: CPT

## 2025-07-02 PROCEDURE — 82435 ASSAY OF BLOOD CHLORIDE: CPT

## 2025-07-02 PROCEDURE — 86235 NUCLEAR ANTIGEN ANTIBODY: CPT

## 2025-07-02 PROCEDURE — 86225 DNA ANTIBODY NATIVE: CPT

## 2025-07-02 PROCEDURE — 84295 ASSAY OF SERUM SODIUM: CPT

## 2025-07-02 PROCEDURE — 86682 HELMINTH ANTIBODY: CPT

## 2025-07-02 PROCEDURE — 85027 COMPLETE CBC AUTOMATED: CPT

## 2025-07-02 PROCEDURE — 93970 EXTREMITY STUDY: CPT

## 2025-07-02 PROCEDURE — 76770 US EXAM ABDO BACK WALL COMP: CPT

## 2025-07-02 PROCEDURE — 93005 ELECTROCARDIOGRAM TRACING: CPT

## 2025-07-02 PROCEDURE — 86900 BLOOD TYPING SEROLOGIC ABO: CPT

## 2025-07-02 PROCEDURE — G0103: CPT

## 2025-07-02 PROCEDURE — 86038 ANTINUCLEAR ANTIBODIES: CPT

## 2025-07-02 PROCEDURE — 86901 BLOOD TYPING SEROLOGIC RH(D): CPT

## 2025-07-02 PROCEDURE — 82803 BLOOD GASES ANY COMBINATION: CPT

## 2025-07-02 PROCEDURE — 80048 BASIC METABOLIC PNL TOTAL CA: CPT

## 2025-07-02 PROCEDURE — 82330 ASSAY OF CALCIUM: CPT

## 2025-07-02 PROCEDURE — 99233 SBSQ HOSP IP/OBS HIGH 50: CPT | Mod: GC

## 2025-07-02 PROCEDURE — 83735 ASSAY OF MAGNESIUM: CPT

## 2025-07-02 PROCEDURE — 78582 LUNG VENTILAT&PERFUS IMAGING: CPT

## 2025-07-02 PROCEDURE — 80074 ACUTE HEPATITIS PANEL: CPT

## 2025-07-02 RX ORDER — FUROSEMIDE 10 MG/ML
40 INJECTION INTRAMUSCULAR; INTRAVENOUS ONCE
Refills: 0 | Status: COMPLETED | OUTPATIENT
Start: 2025-07-02 | End: 2025-07-02

## 2025-07-02 RX ORDER — ACETAMINOPHEN 500 MG/5ML
1000 LIQUID (ML) ORAL ONCE
Refills: 0 | Status: COMPLETED | OUTPATIENT
Start: 2025-07-02 | End: 2025-07-02

## 2025-07-02 RX ORDER — FUROSEMIDE 10 MG/ML
40 INJECTION INTRAMUSCULAR; INTRAVENOUS ONCE
Refills: 0 | Status: DISCONTINUED | OUTPATIENT
Start: 2025-07-02 | End: 2025-07-02

## 2025-07-02 RX ADMIN — FUROSEMIDE 40 MILLIGRAM(S): 10 INJECTION INTRAMUSCULAR; INTRAVENOUS at 18:18

## 2025-07-02 RX ADMIN — TAMSULOSIN HYDROCHLORIDE 0.4 MILLIGRAM(S): 0.4 CAPSULE ORAL at 21:20

## 2025-07-02 RX ADMIN — ENOXAPARIN SODIUM 40 MILLIGRAM(S): 100 INJECTION SUBCUTANEOUS at 21:17

## 2025-07-02 RX ADMIN — Medication 1000 MILLIGRAM(S): at 22:24

## 2025-07-02 RX ADMIN — Medication 400 MILLIGRAM(S): at 20:23

## 2025-07-02 RX ADMIN — OXYCODONE HYDROCHLORIDE 5 MILLIGRAM(S): 30 TABLET ORAL at 19:36

## 2025-07-02 NOTE — PROGRESS NOTE ADULT - SUBJECTIVE AND OBJECTIVE BOX
Patient is a 55y old  Male who presents with a chief complaint of abd pain (02 Jul 2025 10:52)      SUBJECTIVE / OVERNIGHT EVENTS: pt anxoius wants to go home, still sob, no cp    MEDICATIONS  (STANDING):  enoxaparin Injectable 40 milliGRAM(s) SubCutaneous every 24 hours  tamsulosin 0.4 milliGRAM(s) Oral at bedtime    MEDICATIONS  (PRN):  acetaminophen     Tablet .. 975 milliGRAM(s) Oral every 8 hours PRN Temp greater or equal to 38C (100.4F), Mild Pain (1 - 3)  oxyCODONE    IR 5 milliGRAM(s) Oral every 6 hours PRN Severe Pain (7 - 10)        CAPILLARY BLOOD GLUCOSE        I&O's Summary    01 Jul 2025 07:01  -  02 Jul 2025 07:00  --------------------------------------------------------  IN: 420 mL / OUT: 1800 mL / NET: -1380 mL    02 Jul 2025 07:01  -  02 Jul 2025 13:06  --------------------------------------------------------  IN: 0 mL / OUT: 500 mL / NET: -500 mL        PHYSICAL EXAM:  GENERAL: NAD, well-developed +hfnc  HEAD:  Atraumatic, Normocephalic  EYES: conjunctiva and sclera clear  NECK: Supple, No JVD  CHEST/LUNG: Clear to auscultation bilaterally; No wheeze  HEART: Regular rate and rhythm; No murmurs, rubs, or gallops  ABDOMEN: Soft, Nontender, Nondistended; Bowel sounds present  EXTREMITIES:  2+ Peripheral Pulses, No clubbing, cyanosis, or edema  PSYCH: AAOx3      LABS:                        14.4   7.64  )-----------( 122      ( 02 Jul 2025 06:45 )             43.2                     RADIOLOGY & ADDITIONAL TESTS:    Imaging Personally Reviewed:    Consultant(s) Notes Reviewed:      Care Discussed with Consultants/Other Providers:
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
## 2025-07-02 NOTE — PROGRESS NOTE ADULT - ASSESSMENT
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 CTA negative for PE, but shows Suboptimal opacification of the peripheral arterial vasculature. The area of concern within the periphery of the right lower lobe lobar artery, (301 image 69). A 4 mm calculus within the mid left ureter with associated mild to moderate left renal hydroureteronephrosis. Presentation concerning for pHTN of unclear etiology at this time. Patient's syncopal episodes could to due to pTHN.     #acute hypoxic respiratory failure  - wean HFNC as tolerated  #history of smoking (quit 2 months ago)  #right heart enlargement   #syncope (CTH negative)  #pulmonary hypertension  - CXR 07/02 shows opacities RLL, can give lasix 20-40 IV once   - consult cards to expedite R heart cath    - HIV, hepatitis serologies  - KELLY, anti-centromere ab, anti topoisomerase I, anti RNP, ESR, CRP, anti dsDNA, anti CCP, RF  - pro-BNP 1500+  - TTE w/ bubble study  - V/Q scan  - eval for R heart cath- would recommend shunt fraction  - appropriate bowel reg to prevent valsalva   - outpatient f/u for PFTs, sleep study  - recommend to restrict pt from driving due to risk of recurrent syncope         55 year-old male from [place] with 40 pack year smoking hx, 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 CTA negative for PE, but shows Suboptimal opacification of the peripheral arterial vasculature. The area of concern within the periphery of the right lower lobe lobar artery, (301 image 69). A 4 mm calculus within the mid left ureter with associated mild to moderate left renal hydroureteronephrosis. Presentation concerning for pHTN of unclear etiology at this time. Patient's syncopal episodes could to due to pTHN.     #acute hypoxic respiratory failure  #history of smoking (quit 2 months ago)  #right heart enlargement   #syncope (CTH negative)  #pulmonary hypertension  - can switch to NRB (HFNC while eating), trial NC 6l   - CXR 07/02 shows opacities RLL, can give lasix 20-40 IV once   - consult cards to expedite R heart cath  - HIV, hepatitis serologies  - KELLY, anti-centromere ab, anti topoisomerase I, anti RNP, ESR, CRP, anti dsDNA, anti CCP, RF  - pro-BNP 1500+  - TTE w/ bubble study  - V/Q scan  - eval for R heart cath- would recommend shunt fraction  - appropriate bowel reg to prevent valsalva   - outpatient f/u for PFTs, sleep study  - recommend to restrict pt from driving due to risk of recurrent #***Syncope  - ***pertinent HPI/risk factors  - Suspect vasovagal***orthostatic because ***  - Low suspicion for PE (if able, PERC rule***)  - EKG: ***  - CTH: ***  - Admit w/ tele  [ ] Orthostatic VS  [ ] interrogate ICD  [ ] f/u TTE

## 2025-07-02 NOTE — PROGRESS NOTE ADULT - SUBJECTIVE AND OBJECTIVE BOX
HPI:    Patient seen at bedside, pt is still requiring HFNC 40/60 saturating at 90%. pt's abdominal pain has resolved. Denies any fever, chills, CP, wheezing, n/v, voiding normally, no change in bowel movement.     No acute events overnight.     VITAL SIGNS:  T(F): 97.5 (07-01-25 @ 11:44)  HR: 73 (07-01-25 @ 11:44)  BP: 101/68 (07-01-25 @ 11:44)  RR: 19 (07-01-25 @ 11:44)  SpO2: 93% (07-01-25 @ 11:44)  Wt(kg): --    PHYSICAL EXAM:    Constitutional: WDWN, NAD  HEENT: PERRL, EOMI, sclera non-icteric, neck supple, trachea midline, no masses, no JVD, MMM, good dentition  Respiratory: CTA b/l, good air entry b/l, no wheezing, no rhonchi, no rales, mild accessory muscle use and no intercostal retractions  Cardiovascular: RRR, normal S1S2, no M/R/G  Gastrointestinal: soft, NTND, no masses palpable, BS normal  Extremities: Warm, well perfused, pulses equal bilateral upper and lower extremities, no edema, no clubbing. Capillary refill <2 sec  Neurological: AAOx3, CN Grossly intact  Skin: Normal temperature, warm, dry    MEDICATIONS  (STANDING):  enoxaparin Injectable 40 milliGRAM(s) SubCutaneous every 24 hours  lactated ringers. 1000 milliLiter(s) (100 mL/Hr) IV Continuous <Continuous>  tamsulosin 0.4 milliGRAM(s) Oral at bedtime    MEDICATIONS  (PRN):  acetaminophen     Tablet .. 975 milliGRAM(s) Oral every 8 hours PRN Temp greater or equal to 38C (100.4F), Mild Pain (1 - 3)      Allergies    No Known Allergies    Intolerances        LABS:                        14.8   11.62 )-----------( 127      ( 01 Jul 2025 06:51 )             43.7     06-30    137  |  107  |  16  ----------------------------<  103[H]  4.1   |  18[L]  |  1.26    Ca    8.6      30 Jun 2025 10:20        Urinalysis Basic - ( 30 Jun 2025 10:20 )    Color: x / Appearance: x / SG: x / pH: x  Gluc: 103 mg/dL / Ketone: x  / Bili: x / Urobili: x   Blood: x / Protein: x / Nitrite: x   Leuk Esterase: x / RBC: x / WBC x   Sq Epi: x / Non Sq Epi: x / Bacteria: x        RADIOLOGY & ADDITIONAL TESTS:  Reviewed     Patient seen at bedside, pt is still requiring HFNC 40/60 saturating at 90%. pt's abdominal pain has resolved. Denies any fever, chills, CP, wheezing, n/v, voiding normally, no change in bowel movement.     No acute events overnight.     VITAL SIGNS:  T(F): 97.5 (07-01-25 @ 11:44)  HR: 73 (07-01-25 @ 11:44)  BP: 101/68 (07-01-25 @ 11:44)  RR: 19 (07-01-25 @ 11:44)  SpO2: 93% (07-01-25 @ 11:44)      PHYSICAL EXAM:    Constitutional: WDWN, NAD  HEENT: PERRL, EOMI, sclera non-icteric, neck supple, trachea midline, no masses, no JVD, MMM, good dentition  Respiratory: CTA b/l, good air entry b/l, no wheezing, no rhonchi, no rales, mild accessory muscle use and no intercostal retractions  Cardiovascular: RRR, normal S1S2, no M/R/G  Gastrointestinal: soft, NTND, no masses palpable, BS normal  Extremities: Warm, well perfused, pulses equal bilateral upper and lower extremities, no edema, no clubbing. Capillary refill <2 sec  Neurological: AAOx3, CN Grossly intact  Skin: Normal temperature, warm, dry    MEDICATIONS  (STANDING):  enoxaparin Injectable 40 milliGRAM(s) SubCutaneous every 24 hours  lactated ringers. 1000 milliLiter(s) (100 mL/Hr) IV Continuous <Continuous>  tamsulosin 0.4 milliGRAM(s) Oral at bedtime    MEDICATIONS  (PRN):  acetaminophen     Tablet .. 975 milliGRAM(s) Oral every 8 hours PRN Temp greater or equal to 38C (100.4F), Mild Pain (1 - 3)      Allergies    No Known Allergies    Intolerances        LABS:                        14.8   11.62 )-----------( 127      ( 01 Jul 2025 06:51 )             43.7     06-30    137  |  107  |  16  ----------------------------<  103[H]  4.1   |  18[L]  |  1.26    Ca    8.6      30 Jun 2025 10:20        Urinalysis Basic - ( 30 Jun 2025 10:20 )    Color: x / Appearance: x / SG: x / pH: x  Gluc: 103 mg/dL / Ketone: x  / Bili: x / Urobili: x   Blood: x / Protein: x / Nitrite: x   Leuk Esterase: x / RBC: x / WBC x   Sq Epi: x / Non Sq Epi: x / Bacteria: x        RADIOLOGY & ADDITIONAL TESTS:  Reviewed
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
## 2025-07-03 LAB
ALBUMIN SERPL ELPH-MCNC: 3.2 G/DL — LOW (ref 3.3–5)
ALP SERPL-CCNC: 69 U/L — SIGNIFICANT CHANGE UP (ref 40–120)
ALT FLD-CCNC: 40 U/L — SIGNIFICANT CHANGE UP (ref 10–45)
ANA TITR SER: NEGATIVE — SIGNIFICANT CHANGE UP
ANION GAP SERPL CALC-SCNC: 14 MMOL/L — SIGNIFICANT CHANGE UP (ref 5–17)
AST SERPL-CCNC: 42 U/L — HIGH (ref 10–40)
BILIRUB SERPL-MCNC: 0.9 MG/DL — SIGNIFICANT CHANGE UP (ref 0.2–1.2)
BUN SERPL-MCNC: 13 MG/DL — SIGNIFICANT CHANGE UP (ref 7–23)
CALCIUM SERPL-MCNC: 8.6 MG/DL — SIGNIFICANT CHANGE UP (ref 8.4–10.5)
CHLORIDE SERPL-SCNC: 107 MMOL/L — SIGNIFICANT CHANGE UP (ref 96–108)
CO2 SERPL-SCNC: 18 MMOL/L — LOW (ref 22–31)
CREAT SERPL-MCNC: 1.06 MG/DL — SIGNIFICANT CHANGE UP (ref 0.5–1.3)
EGFR: 82 ML/MIN/1.73M2 — SIGNIFICANT CHANGE UP
EGFR: 82 ML/MIN/1.73M2 — SIGNIFICANT CHANGE UP
GLUCOSE SERPL-MCNC: 77 MG/DL — SIGNIFICANT CHANGE UP (ref 70–99)
HCT VFR BLD CALC: 46.8 % — SIGNIFICANT CHANGE UP (ref 39–50)
HGB BLD-MCNC: 16.3 G/DL — SIGNIFICANT CHANGE UP (ref 13–17)
HGB FLD-MCNC: 16.7 G/DL — SIGNIFICANT CHANGE UP (ref 12.6–17.4)
MAGNESIUM SERPL-MCNC: 1.8 MG/DL — SIGNIFICANT CHANGE UP (ref 1.6–2.6)
MCHC RBC-ENTMCNC: 31.3 PG — SIGNIFICANT CHANGE UP (ref 27–34)
MCHC RBC-ENTMCNC: 34.8 G/DL — SIGNIFICANT CHANGE UP (ref 32–36)
MCV RBC AUTO: 90 FL — SIGNIFICANT CHANGE UP (ref 80–100)
NRBC # BLD AUTO: 0 K/UL — SIGNIFICANT CHANGE UP (ref 0–0)
NRBC # FLD: 0 K/UL — SIGNIFICANT CHANGE UP (ref 0–0)
NRBC BLD AUTO-RTO: 0 /100 WBCS — SIGNIFICANT CHANGE UP (ref 0–0)
OXYHGB MFR BLDMV: 59.3 % — SIGNIFICANT CHANGE UP
PHOSPHATE SERPL-MCNC: 3.9 MG/DL — SIGNIFICANT CHANGE UP (ref 2.5–4.5)
PLATELET # BLD AUTO: 158 K/UL — SIGNIFICANT CHANGE UP (ref 150–400)
PMV BLD: 12.8 FL — SIGNIFICANT CHANGE UP (ref 7–13)
POTASSIUM SERPL-MCNC: 3.5 MMOL/L — SIGNIFICANT CHANGE UP (ref 3.5–5.3)
POTASSIUM SERPL-SCNC: 3.5 MMOL/L — SIGNIFICANT CHANGE UP (ref 3.5–5.3)
PROT SERPL-MCNC: 5.4 G/DL — LOW (ref 6–8.3)
RBC # BLD: 5.2 M/UL — SIGNIFICANT CHANGE UP (ref 4.2–5.8)
RBC # FLD: 13.3 % — SIGNIFICANT CHANGE UP (ref 10.3–14.5)
SAO2 % BLD: 60.1 % — SIGNIFICANT CHANGE UP (ref 60–90)
SODIUM SERPL-SCNC: 139 MMOL/L — SIGNIFICANT CHANGE UP (ref 135–145)
WBC # BLD: 9.78 K/UL — SIGNIFICANT CHANGE UP (ref 3.8–10.5)
WBC # FLD AUTO: 9.78 K/UL — SIGNIFICANT CHANGE UP (ref 3.8–10.5)

## 2025-07-03 PROCEDURE — 99233 SBSQ HOSP IP/OBS HIGH 50: CPT | Mod: GC

## 2025-07-03 PROCEDURE — 99232 SBSQ HOSP IP/OBS MODERATE 35: CPT

## 2025-07-03 PROCEDURE — 99152 MOD SED SAME PHYS/QHP 5/>YRS: CPT

## 2025-07-03 PROCEDURE — 93456 R HRT CORONARY ARTERY ANGIO: CPT | Mod: 26

## 2025-07-03 PROCEDURE — 93308 TTE F-UP OR LMTD: CPT | Mod: 26,GC

## 2025-07-03 PROCEDURE — 99233 SBSQ HOSP IP/OBS HIGH 50: CPT

## 2025-07-03 RX ORDER — FUROSEMIDE 10 MG/ML
40 INJECTION INTRAMUSCULAR; INTRAVENOUS DAILY
Refills: 0 | Status: DISCONTINUED | OUTPATIENT
Start: 2025-07-03 | End: 2025-07-05

## 2025-07-03 RX ORDER — MAGNESIUM SULFATE 500 MG/ML
1 SYRINGE (ML) INJECTION ONCE
Refills: 0 | Status: COMPLETED | OUTPATIENT
Start: 2025-07-03 | End: 2025-07-03

## 2025-07-03 RX ADMIN — Medication 100 GRAM(S): at 18:30

## 2025-07-03 RX ADMIN — TAMSULOSIN HYDROCHLORIDE 0.4 MILLIGRAM(S): 0.4 CAPSULE ORAL at 21:28

## 2025-07-03 RX ADMIN — Medication 40 MILLIEQUIVALENT(S): at 13:07

## 2025-07-03 NOTE — PROGRESS NOTE ADULT - SUBJECTIVE AND OBJECTIVE BOX
[facility] Attending Follow-up Note     Patient seen and examined at bedside. 7/3/2025    Overnight Events:     events noted   for R/[facility]     REVIEW OF SYSTEMS:  Constitutional:     [x ] negative [ ] fevers [ ] chills [ ] weight loss [ ] weight gain  HEENT:                  [x ] negative [ ] dry eyes [ ] eye irritation [ ] postnasal drip [ ] nasal congestion  CV:                         [ x] negative  [ ] chest pain [ ] orthopnea [ ] palpitations [ ] murmur  Resp:                     [ x] negative [ ] cough [ ] shortness of breath [ ] dyspnea [ ] wheezing [ ] sputum [ ]hemoptysis  GI:                          [ x] negative [ ] nausea [ ] vomiting [ ] diarrhea [ ] constipation [ ] abd pain [ ] dysphagia   :                        [ x] negative [ ] dysuria [ ] nocturia [ ] hematuria [ ] increased urinary frequency  Musculoskeletal: [ x] negative [ ] back pain [ ] myalgias [ ] arthralgias [ ] fracture  Skin:                       [ x] negative [ ] rash [ ] itch  Neurological:        [x ] negative [ ] headache [ ] dizziness [ ] syncope [ ] weakness [ ] numbness  Psychiatric:           [ x] negative [ ] anxiety [ ] depression  Endocrine:            [ x] negative [ ] diabetes [ ] thyroid problem  Heme/Lymph:      [ x] negative [ ] anemia [ ] bleeding problem  Allergic/Immune: [ x] negative [ ] itchy eyes [ ] nasal discharge [ ] hives [ ] angioedema    [ x] All other systems negative  [ ] Unable to assess ROS due to    Current Meds:  acetaminophen     Tablet .. 975 milliGRAM(s) Oral every 8 hours PRN  calamine/zinc oxide Lotion 1 Application(s) Topical two times a day  enoxaparin Injectable 40 milliGRAM(s) SubCutaneous every 24 hours  furosemide   Injectable 40 milliGRAM(s) IV Push two times a day  oxyCODONE    IR 5 milliGRAM(s) Oral every 6 hours PRN  sildenafil (REVATIO) 10 milliGRAM(s) Oral three times a day  tamsulosin 0.4 milliGRAM(s) Oral at bedtime      PAST MEDICAL & SURGICAL HISTORY:  Former heavy tobacco smoker      Kidney stone      No significant past surgical history          Vitals:  T(F): 97.3 (07-08), Max: 97.5 (07-07)  HR: 69 (07-08) (69 - 71)  BP: 104/64 (07-08) (100/64 - 104/64)  RR: 18 (07-08)  SpO2: 88% (07-08)  I&O's Summary    07 Jul 2025 07:01  -  08 Jul 2025 07:00  --------------------------------------------------------  IN: 200 mL / OUT: 0 mL / NET: 200 mL        Physical Exam:  Appearance: No acute distress  HENT: No JVD   Cardiovascular: RRR, S1/S2, no murmurs  Respiratory: CTABL  Gastrointestinal: soft, NT ND, +BS  Musculoskeletal: No clubbing, no edema   Neurologic: Non-focal  Skin: No rashes, ecchymoses, or cyanosis                          16.5   8.88  )-----------( 169      ( 07 Jul 2025 06:38 )             49.8     07-07    139  |  108  |  24[H]  ----------------------------<  90  3.8   |  16[L]  |  0.95    Ca    8.9      07 Jul 2025 06:39                    Cardiovascular Testings:

## 2025-07-03 NOTE — PROGRESS NOTE ADULT - SUBJECTIVE AND OBJECTIVE BOX
Patient is a 55y old  Male who presents with a chief complaint of abd pain (03 Jul 2025 08:26)      SUBJECTIVE / OVERNIGHT EVENTS: pt feels ok denies cp, sob, palpiations, abd pain     MEDICATIONS  (STANDING):  enoxaparin Injectable 40 milliGRAM(s) SubCutaneous every 24 hours  furosemide    Tablet 40 milliGRAM(s) Oral daily  tamsulosin 0.4 milliGRAM(s) Oral at bedtime    MEDICATIONS  (PRN):  acetaminophen     Tablet .. 975 milliGRAM(s) Oral every 8 hours PRN Temp greater or equal to 38C (100.4F), Mild Pain (1 - 3)  oxyCODONE    IR 5 milliGRAM(s) Oral every 6 hours PRN Severe Pain (7 - 10)        CAPILLARY BLOOD GLUCOSE        I&O's Summary    02 Jul 2025 07:01  -  03 Jul 2025 07:00  --------------------------------------------------------  IN: 200 mL / OUT: 900 mL / NET: -700 mL        PHYSICAL EXAM:  GENERAL: NAD, well-developed  HEAD:  Atraumatic, Normocephalic  EYES: EOMI, PERRLA, conjunctiva and sclera clear  NECK: Supple, No JVD  CHEST/LUNG: Clear to auscultation bilaterally; No wheeze  HEART: Regular rate and rhythm; No murmurs, rubs, or gallops  ABDOMEN: Soft, Nontender, Nondistended; Bowel sounds present  EXTREMITIES:  2+ Peripheral Pulses, No clubbing, cyanosis, or edema  PSYCH: AAOx3  NEUROLOGY: non-focal  SKIN: No rashes or lesions    LABS:                        16.3   9.78  )-----------( 158      ( 03 Jul 2025 06:47 )             46.8     07-03    139  |  107  |  13  ----------------------------<  77  3.5   |  18[L]  |  1.06    Ca    8.6      03 Jul 2025 06:49  Phos  3.9     07-03  Mg     1.8     07-03    TPro  5.4[L]  /  Alb  3.2[L]  /  TBili  0.9  /  DBili  x   /  AST  42[H]  /  ALT  40  /  AlkPhos  69  07-03          Urinalysis Basic - ( 03 Jul 2025 06:49 )    Color: x / Appearance: x / SG: x / pH: x  Gluc: 77 mg/dL / Ketone: x  / Bili: x / Urobili: x   Blood: x / Protein: x / Nitrite: x   Leuk Esterase: x / RBC: x / WBC x   Sq Epi: x / Non Sq Epi: x / Bacteria: x        RADIOLOGY & ADDITIONAL TESTS:    Imaging Personally Reviewed:    Consultant(s) Notes Reviewed:      Care Discussed with Consultants/Other Providers:
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
## 2025-07-03 NOTE — PROGRESS NOTE ADULT - SUBJECTIVE AND OBJECTIVE BOX
Patient seen at bedside, pt is still requiring HFNC 50/60 saturating at 92%. pt's abdominal pain has resolved. pt is anxious, would like to go home. no fever, chills, CP, wheezing, n/v, voiding normally, no change in bowel movement.       VITAL SIGNS:  T(C): 37 (07-03-25 @ 04:30), Max: 37 (07-03-25 @ 04:30)  HR: 69 (07-03-25 @ 04:30) (58 - 71)  BP: 100/70 (07-03-25 @ 04:30) (100/70 - 112/71)  RR: 18 (07-03-25 @ 04:30) (18 - 20)  SpO2: 93% (07-03-25 @ 04:30) (92% - 95%)    PHYSICAL EXAM:    Constitutional: WDWN, NAD  HEENT: PERRL, EOMI, sclera non-icteric, neck supple, trachea midline, no masses, no JVD, MMM, good dentition  Respiratory: CTA b/l, good air entry b/l, no wheezing, no rhonchi, no rales, without accessory muscle use and no intercostal retractions  Cardiovascular: RRR, normal S1S2, no M/R/G  Gastrointestinal: soft, NTND, no masses palpable, BS normal  Extremities: Warm, well perfused, pulses equal bilateral upper and lower extremities, no edema, no clubbing. Capillary refill <2 sec  Neurological: AAOx3, CN Grossly intact  Skin: Normal temperature, warm, dry    MEDICATIONS  (STANDING):  enoxaparin Injectable 40 milliGRAM(s) SubCutaneous every 24 hours  potassium chloride    Tablet ER 40 milliEquivalent(s) Oral once  tamsulosin 0.4 milliGRAM(s) Oral at bedtime    MEDICATIONS  (PRN):  acetaminophen     Tablet .. 975 milliGRAM(s) Oral every 8 hours PRN Temp greater or equal to 38C (100.4F), Mild Pain (1 - 3)  oxyCODONE    IR 5 milliGRAM(s) Oral every 6 hours PRN Severe Pain (7 - 10)      Allergies    No Known Allergies    Intolerances        LABS:                        16.3   9.78  )-----------( 158      ( 03 Jul 2025 06:47 )             46.8     07-03    139  |  107  |  13  ----------------------------<  77  3.5   |  18[L]  |  1.06    Ca    8.6      03 Jul 2025 06:49  Phos  3.9     07-03  Mg     1.8     07-03    TPro  5.4[L]  /  Alb  3.2[L]  /  TBili  0.9  /  DBili  x   /  AST  42[H]  /  ALT  40  /  AlkPhos  69  07-03      Urinalysis Basic - ( 03 Jul 2025 06:49 )    Color: x / Appearance: x / SG: x / pH: x  Gluc: 77 mg/dL / Ketone: x  / Bili: x / Urobili: x   Blood: x / Protein: x / Nitrite: x   Leuk Esterase: x / RBC: x / WBC x   Sq Epi: x / Non Sq Epi: x / Bacteria: x        RADIOLOGY & ADDITIONAL TESTS:  Reviewed      ******************  Authored By: Jeremias Chanel  Internal Medicine  MS Teams Preferred  ******************

## 2025-07-03 NOTE — PROGRESS NOTE ADULT - ASSESSMENT
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 CTA negative for PE, but shows Suboptimal opacification of the peripheral arterial vasculature. The area of concern within the periphery of the right lower lobe lobar artery, (301 image 69). A 4 mm calculus within the mid left ureter with associated mild to moderate left renal hydroureteronephrosis. Presentation concerning for pHTN of unclear etiology at this time. Patient's syncopal episodes could to due to pTHN.     #acute hypoxic respiratory failure  #history of smoking (quit 2 months ago)  #right heart enlargement   #syncope (CTH negative)  #pulmonary hypertension  #RV failure   - alternate between HFNC/NRB as tolerated, trial NC 6l if tolerates  - HIV, Hep panel, autoimmune serologies negative  - V/Q scan shows low probability for PE   - TTE shows SPAP of 94mmg, severe enlarged RV, dilated RA consistent w/ severe PHTN  - per cards, RHC + NO study today; consult cards to expedite R heart cath  - appropriate bowel reg to prevent valsalva   - outpatient f/u for PFTs, sleep study  - recommend to restrict pt from driving due to risk of recurrent          55 year-old male from [place] with 40 pack year smoking hx, 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 CTA negative for PE, but shows Suboptimal opacification of the peripheral arterial vasculature. The area of concern within the periphery of the right lower lobe lobar artery, (301 image 69). A 4 mm calculus within the mid left ureter with associated mild to moderate left renal hydroureteronephrosis. Presentation concerning for pHTN of unclear etiology at this time. Patient's syncopal episodes could to due to pTHN.     #acute hypoxic respiratory failure  #history of smoking (quit 2 months ago)  #right heart enlargement   #syncope (CTH negative)  #pulmonary hypertension  #RV failure  - saturating at 94% on NC 6l, can be off HFNC, NRB/HFNC if desats    - lasix 20-40mg QID   - HIV, Hep panel, autoimmune serologies negative  - V/Q scan shows low probability for PE   - TTE shows SPAP of 94mmg, severe enlarged RV, dilated RA consistent w/ severe PHTN  - per cards, planned RHC + NO today pending; consult cards to expedite R heart cath  - appropriate bowel reg to prevent valsalva   - outpatient f/u for PFTs, sleep study  - recommend to restrict pt from driving due to risk of recurrent

## 2025-07-04 LAB
ANION GAP SERPL CALC-SCNC: 16 MMOL/L — SIGNIFICANT CHANGE UP (ref 5–17)
BUN SERPL-MCNC: 11 MG/DL — SIGNIFICANT CHANGE UP (ref 7–23)
CALCIUM SERPL-MCNC: 8.5 MG/DL — SIGNIFICANT CHANGE UP (ref 8.4–10.5)
CHLORIDE SERPL-SCNC: 105 MMOL/L — SIGNIFICANT CHANGE UP (ref 96–108)
CO2 SERPL-SCNC: 18 MMOL/L — LOW (ref 22–31)
CREAT SERPL-MCNC: 0.87 MG/DL — SIGNIFICANT CHANGE UP (ref 0.5–1.3)
EGFR: 101 ML/MIN/1.73M2 — SIGNIFICANT CHANGE UP
EGFR: 101 ML/MIN/1.73M2 — SIGNIFICANT CHANGE UP
GLUCOSE SERPL-MCNC: 85 MG/DL — SIGNIFICANT CHANGE UP (ref 70–99)
HCT VFR BLD CALC: 45.9 % — SIGNIFICANT CHANGE UP (ref 39–50)
HGB BLD-MCNC: 15.8 G/DL — SIGNIFICANT CHANGE UP (ref 13–17)
MCHC RBC-ENTMCNC: 30.7 PG — SIGNIFICANT CHANGE UP (ref 27–34)
MCHC RBC-ENTMCNC: 34.4 G/DL — SIGNIFICANT CHANGE UP (ref 32–36)
MCV RBC AUTO: 89.3 FL — SIGNIFICANT CHANGE UP (ref 80–100)
NRBC # BLD AUTO: 0 K/UL — SIGNIFICANT CHANGE UP (ref 0–0)
NRBC # FLD: 0 K/UL — SIGNIFICANT CHANGE UP (ref 0–0)
NRBC BLD AUTO-RTO: 0 /100 WBCS — SIGNIFICANT CHANGE UP (ref 0–0)
PLATELET # BLD AUTO: 158 K/UL — SIGNIFICANT CHANGE UP (ref 150–400)
PMV BLD: 12.2 FL — SIGNIFICANT CHANGE UP (ref 7–13)
POTASSIUM SERPL-MCNC: 3.9 MMOL/L — SIGNIFICANT CHANGE UP (ref 3.5–5.3)
POTASSIUM SERPL-SCNC: 3.9 MMOL/L — SIGNIFICANT CHANGE UP (ref 3.5–5.3)
RBC # BLD: 5.14 M/UL — SIGNIFICANT CHANGE UP (ref 4.2–5.8)
RBC # FLD: 13.2 % — SIGNIFICANT CHANGE UP (ref 10.3–14.5)
SODIUM SERPL-SCNC: 139 MMOL/L — SIGNIFICANT CHANGE UP (ref 135–145)
WBC # BLD: 8.59 K/UL — SIGNIFICANT CHANGE UP (ref 3.8–10.5)
WBC # FLD AUTO: 8.59 K/UL — SIGNIFICANT CHANGE UP (ref 3.8–10.5)

## 2025-07-04 PROCEDURE — 99232 SBSQ HOSP IP/OBS MODERATE 35: CPT

## 2025-07-04 RX ADMIN — TAMSULOSIN HYDROCHLORIDE 0.4 MILLIGRAM(S): 0.4 CAPSULE ORAL at 21:38

## 2025-07-04 RX ADMIN — FUROSEMIDE 40 MILLIGRAM(S): 10 INJECTION INTRAMUSCULAR; INTRAVENOUS at 05:47

## 2025-07-04 NOTE — PROGRESS NOTE ADULT - ASSESSMENT
55M w/ + smoking hx, known severe pHTN and ASD (sees Dr. Maverick Craven in [place], [place]). He has had multiple LOC in the past and now here for kidney stone and cardiology consulted for severe pHTN     -severe pHTN w/ multiple syncopal episode   -V/Q scan negative for PE   -s/p R/LHC, severe pulm HTN, near systemic BP   -rec pulmonary HTN team eval   -possible transplant eval   -ASD closure pending transplant course       Blaise Jordan MD [facility]  Attending Interventional Cardiologist, [facility].   Avaliable on "[employer] Team

## 2025-07-04 NOTE — PROGRESS NOTE ADULT - SUBJECTIVE AND OBJECTIVE BOX
Patient is a 55y old  Male who presents with a chief complaint of abd pain (03 Jul 2025 08:26)      SUBJECTIVE / OVERNIGHT EVENTS: pt feels ok denies cp, sob, palpiations, abd pain     MEDICATIONS  (STANDING):  enoxaparin Injectable 40 milliGRAM(s) SubCutaneous every 24 hours  furosemide    Tablet 40 milliGRAM(s) Oral daily  tamsulosin 0.4 milliGRAM(s) Oral at bedtime    MEDICATIONS  (PRN):  acetaminophen     Tablet .. 975 milliGRAM(s) Oral every 8 hours PRN Temp greater or equal to 38C (100.4F), Mild Pain (1 - 3)  oxyCODONE    IR 5 milliGRAM(s) Oral every 6 hours PRN Severe Pain (7 - 10)        CAPILLARY BLOOD GLUCOSE        I&O's Summary    02 Jul 2025 07:01  -  03 Jul 2025 07:00  --------------------------------------------------------  IN: 200 mL / OUT: 900 mL / NET: -700 mL        PHYSICAL EXAM:  GENERAL: NAD, well-developed  HEAD:  Atraumatic, Normocephalic  EYES: EOMI, PERRLA, conjunctiva and sclera clear  NECK: Supple, No JVD  CHEST/LUNG: Clear to auscultation bilaterally; No wheeze  HEART: Regular rate and rhythm; No murmurs, rubs, or gallops  ABDOMEN: Soft, Nontender, Nondistended; Bowel sounds present  EXTREMITIES:  2+ Peripheral Pulses, No clubbing, cyanosis, or edema  PSYCH: AAOx3  NEUROLOGY: non-focal  SKIN: No rashes or lesions    LABS:                                15.8   8.59  )-----------( 158      ( 04 Jul 2025 07:24 )             45.9       07-04    139  |  105  |  11  ----------------------------<  85  3.9   |  18[L]  |  0.87    Ca    8.5      04 Jul 2025 07:19  Phos  3.9     07-03  Mg     1.8     07-03    TPro  5.4[L]  /  Alb  3.2[L]  /  TBili  0.9  /  DBili  x   /  AST  42[H]  /  ALT  40  /  AlkPhos  69  07-03            Urinalysis Basic - ( 03 Jul 2025 06:49 )    Color: x / Appearance: x / SG: x / pH: x  Gluc: 77 mg/dL / Ketone: x  / Bili: x / Urobili: x   Blood: x / Protein: x / Nitrite: x   Leuk Esterase: x / RBC: x / WBC x   Sq Epi: x / Non Sq Epi: x / Bacteria: x        RADIOLOGY & ADDITIONAL TESTS:    Imaging Personally Reviewed:    Consultant(s) Notes Reviewed:      Care Discussed with Consultants/Other Providers:

## 2025-07-04 NOTE — PROGRESS NOTE ADULT - SUBJECTIVE AND OBJECTIVE BOX
[facility] Attending Follow-up Note     Patient seen and examined at bedside. 7/4/2025    Overnight Events:     events noted     REVIEW OF SYSTEMS:  Constitutional:     [x ] negative [ ] fevers [ ] chills [ ] weight loss [ ] weight gain  HEENT:                  [x ] negative [ ] dry eyes [ ] eye irritation [ ] postnasal drip [ ] nasal congestion  CV:                         [ x] negative  [ ] chest pain [ ] orthopnea [ ] palpitations [ ] murmur  Resp:                     [ x] negative [ ] cough [ ] shortness of breath [ ] dyspnea [ ] wheezing [ ] sputum [ ]hemoptysis  GI:                          [ x] negative [ ] nausea [ ] vomiting [ ] diarrhea [ ] constipation [ ] abd pain [ ] dysphagia   :                        [ x] negative [ ] dysuria [ ] nocturia [ ] hematuria [ ] increased urinary frequency  Musculoskeletal: [ x] negative [ ] back pain [ ] myalgias [ ] arthralgias [ ] fracture  Skin:                       [ x] negative [ ] rash [ ] itch  Neurological:        [x ] negative [ ] headache [ ] dizziness [ ] syncope [ ] weakness [ ] numbness  Psychiatric:           [ x] negative [ ] anxiety [ ] depression  Endocrine:            [ x] negative [ ] diabetes [ ] thyroid problem  Heme/Lymph:      [ x] negative [ ] anemia [ ] bleeding problem  Allergic/Immune: [ x] negative [ ] itchy eyes [ ] nasal discharge [ ] hives [ ] angioedema    [ x] All other systems negative  [ ] Unable to assess ROS due to    Current Meds:  acetaminophen     Tablet .. 975 milliGRAM(s) Oral every 8 hours PRN  calamine/zinc oxide Lotion 1 Application(s) Topical two times a day  enoxaparin Injectable 40 milliGRAM(s) SubCutaneous every 24 hours  furosemide   Injectable 40 milliGRAM(s) IV Push two times a day  oxyCODONE    IR 5 milliGRAM(s) Oral every 6 hours PRN  sildenafil (REVATIO) 10 milliGRAM(s) Oral three times a day  tamsulosin 0.4 milliGRAM(s) Oral at bedtime      PAST MEDICAL & SURGICAL HISTORY:  Former heavy tobacco smoker      Kidney stone      No significant past surgical history          Vitals:  T(F): 97.3 (07-08), Max: 97.5 (07-07)  HR: 69 (07-08) (69 - 71)  BP: 104/64 (07-08) (100/64 - 104/64)  RR: 18 (07-08)  SpO2: 88% (07-08)  I&O's Summary    07 Jul 2025 07:01  -  08 Jul 2025 07:00  --------------------------------------------------------  IN: 200 mL / OUT: 0 mL / NET: 200 mL        Physical Exam:  Appearance: No acute distress  HENT: No JVD   Cardiovascular: RRR, S1/S2, no murmurs  Respiratory: CTABL  Gastrointestinal: soft, NT ND, +BS  Musculoskeletal: No clubbing, no edema   Neurologic: Non-focal  Skin: No rashes, ecchymoses, or cyanosis                          16.5   8.88  )-----------( 169      ( 07 Jul 2025 06:38 )             49.8     07-07    139  |  108  |  24[H]  ----------------------------<  90  3.8   |  16[L]  |  0.95    Ca    8.9      07 Jul 2025 06:39                    Cardiovascular Testings:

## 2025-07-05 LAB
ANION GAP SERPL CALC-SCNC: 16 MMOL/L — SIGNIFICANT CHANGE UP (ref 5–17)
BUN SERPL-MCNC: 18 MG/DL — SIGNIFICANT CHANGE UP (ref 7–23)
CALCIUM SERPL-MCNC: 8.8 MG/DL — SIGNIFICANT CHANGE UP (ref 8.4–10.5)
CHLORIDE SERPL-SCNC: 106 MMOL/L — SIGNIFICANT CHANGE UP (ref 96–108)
CO2 SERPL-SCNC: 17 MMOL/L — LOW (ref 22–31)
CREAT SERPL-MCNC: 0.93 MG/DL — SIGNIFICANT CHANGE UP (ref 0.5–1.3)
EGFR: 96 ML/MIN/1.73M2 — SIGNIFICANT CHANGE UP
EGFR: 96 ML/MIN/1.73M2 — SIGNIFICANT CHANGE UP
GLUCOSE SERPL-MCNC: 91 MG/DL — SIGNIFICANT CHANGE UP (ref 70–99)
HCT VFR BLD CALC: 46.9 % — SIGNIFICANT CHANGE UP (ref 39–50)
HGB BLD-MCNC: 16.1 G/DL — SIGNIFICANT CHANGE UP (ref 13–17)
MCHC RBC-ENTMCNC: 31 PG — SIGNIFICANT CHANGE UP (ref 27–34)
MCHC RBC-ENTMCNC: 34.3 G/DL — SIGNIFICANT CHANGE UP (ref 32–36)
MCV RBC AUTO: 90.2 FL — SIGNIFICANT CHANGE UP (ref 80–100)
NRBC # BLD AUTO: 0 K/UL — SIGNIFICANT CHANGE UP (ref 0–0)
NRBC # FLD: 0 K/UL — SIGNIFICANT CHANGE UP (ref 0–0)
NRBC BLD AUTO-RTO: 0 /100 WBCS — SIGNIFICANT CHANGE UP (ref 0–0)
PLATELET # BLD AUTO: 163 K/UL — SIGNIFICANT CHANGE UP (ref 150–400)
PMV BLD: 12.9 FL — SIGNIFICANT CHANGE UP (ref 7–13)
POTASSIUM SERPL-MCNC: 3.8 MMOL/L — SIGNIFICANT CHANGE UP (ref 3.5–5.3)
POTASSIUM SERPL-SCNC: 3.8 MMOL/L — SIGNIFICANT CHANGE UP (ref 3.5–5.3)
RBC # BLD: 5.2 M/UL — SIGNIFICANT CHANGE UP (ref 4.2–5.8)
RBC # FLD: 13.3 % — SIGNIFICANT CHANGE UP (ref 10.3–14.5)
SODIUM SERPL-SCNC: 139 MMOL/L — SIGNIFICANT CHANGE UP (ref 135–145)
WBC # BLD: 8.53 K/UL — SIGNIFICANT CHANGE UP (ref 3.8–10.5)
WBC # FLD AUTO: 8.53 K/UL — SIGNIFICANT CHANGE UP (ref 3.8–10.5)

## 2025-07-05 PROCEDURE — 99232 SBSQ HOSP IP/OBS MODERATE 35: CPT

## 2025-07-05 PROCEDURE — 99233 SBSQ HOSP IP/OBS HIGH 50: CPT

## 2025-07-05 RX ORDER — FUROSEMIDE 10 MG/ML
40 INJECTION INTRAMUSCULAR; INTRAVENOUS DAILY
Refills: 0 | Status: DISCONTINUED | OUTPATIENT
Start: 2025-07-05 | End: 2025-07-07

## 2025-07-05 RX ORDER — CALAMINE 8% AND ZINC OXIDE 8% 160 MG/ML
1 LOTION TOPICAL
Refills: 0 | Status: DISCONTINUED | OUTPATIENT
Start: 2025-07-05 | End: 2025-07-08

## 2025-07-05 RX ADMIN — TAMSULOSIN HYDROCHLORIDE 0.4 MILLIGRAM(S): 0.4 CAPSULE ORAL at 22:35

## 2025-07-05 NOTE — PROGRESS NOTE ADULT - SUBJECTIVE AND OBJECTIVE BOX
Patient is a 55y old  Male who presents with a chief complaint of abd pain (03 Jul 2025 08:26)      SUBJECTIVE / OVERNIGHT EVENTS:   pt feels ok denies cp, sob, palpiations, abd pain.  still on 5-6L NC  Wants to go home    MEDICATIONS  (STANDING):  enoxaparin Injectable 40 milliGRAM(s) SubCutaneous every 24 hours  furosemide    Tablet 40 milliGRAM(s) Oral daily  tamsulosin 0.4 milliGRAM(s) Oral at bedtime    MEDICATIONS  (PRN):  acetaminophen     Tablet .. 975 milliGRAM(s) Oral every 8 hours PRN Temp greater or equal to 38C (100.4F), Mild Pain (1 - 3)  oxyCODONE    IR 5 milliGRAM(s) Oral every 6 hours PRN Severe Pain (7 - 10)        CAPILLARY BLOOD GLUCOSE        I&O's Summary    02 Jul 2025 07:01  -  03 Jul 2025 07:00  --------------------------------------------------------  IN: 200 mL / OUT: 900 mL / NET: -700 mL        PHYSICAL EXAM:  GENERAL: NAD, well-developed  HEAD:  Atraumatic, Normocephalic  EYES: EOMI, PERRLA, conjunctiva and sclera clear  NECK: Supple, No JVD  CHEST/LUNG: Clear to auscultation bilaterally; No wheeze  HEART: Regular rate and rhythm; No murmurs, rubs, or gallops  ABDOMEN: Soft, Nontender, Nondistended; Bowel sounds present  EXTREMITIES:  2+ Peripheral Pulses, No clubbing, cyanosis, or edema  PSYCH: AAOx3  NEUROLOGY: non-focal  SKIN: No rashes or lesions    LABS:                            Urinalysis Basic - ( 03 Jul 2025 06:49 )    Color: x / Appearance: x / SG: x / pH: x  Gluc: 77 mg/dL / Ketone: x  / Bili: x / Urobili: x   Blood: x / Protein: x / Nitrite: x   Leuk Esterase: x / RBC: x / WBC x   Sq Epi: x / Non Sq Epi: x / Bacteria: x        RADIOLOGY & ADDITIONAL TESTS:    Imaging Personally Reviewed:    Consultant(s) Notes Reviewed:      Care Discussed with Consultants/Other Providers:

## 2025-07-05 NOTE — PROGRESS NOTE ADULT - ASSESSMENT
55M w/ + smoking hx, known severe pHTN and ASD (sees Dr. Maverick Craven in [place], [place]). He has had multiple LOC in the past and now here for kidney stone and cardiology consulted for severe pHTN     -severe pHTN w/ multiple syncopal episode   -V/Q scan negative for PE   -s/p R/LHC, severe pulm HTN, near systemic BP   -rec pulmonary HTN team eval   -possible transplant eval   -ASD closure pending transplant course       Blaise Jordan MD [facility]  Attending Interventional Cardiologist, [facility].   Avaliable on [employer] Team

## 2025-07-05 NOTE — PROGRESS NOTE ADULT - ASSESSMENT
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 Found to have hypoxemia to 70% on room air requiring high flow nasal cannula +/- 100% NRB. CTPA with no evidence of PE, but with dilated pulmonary artery, RA/RV enlargement, RV thickening, reflux of contrast into the IVC, and hepatic congestion all suggestive of severe pulmonary hypertension. Also with significant emphysema on imaging. Found on echo to have severe pulmonary hypertension with flattening of the interventricular septum with systole/diastole consistent with pressure and volume overload with severe RA/RV enlargement and decreased RV systolic function and estimated PASP 94 mm Hg. Bedside bubble study today consistent with likely intracardiac shunt as bubbles appeared on left after 4-5 cycles. Possible component of emphysema/COPD contributing to pulmonary hypertension, but primary etiology likely WHO Group I> There is no evidence of significant parenchymal lung disease on CT that is contributing to his pulmonary hypertension. Labs so far unrevealing, including HIV, acute hepatitis labs, RF, CCP, dsDNA, centromere, scleroderma, Anahi-1, A1AT. F/up KELLY. Pt now s/p LCH/RHC with shunt fraction and vasoreactivity testing. Suspect unrepaired intracardiac shunt. CT chest reviewed with radiology previously, no evidence of pulmonary AVM. V/Q scan with very low probability for PE, no evidence of CTEPH. Profound hypoxemia likely due to pulmonary hypertension + R-->L shunt.     - Will likely require home oxygen therapy  - Lasix increased today to IV BID. 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 Found to have hypoxemia to 70% on room air requiring high flow nasal cannula +/- 100% NRB. CTPA with no evidence of PE, but with dilated pulmonary artery, RA/RV enlargement, RV thickening, reflux of contrast into the IVC, and hepatic congestion all suggestive of severe pulmonary hypertension. Also with significant emphysema on imaging. Found on echo to have severe pulmonary hypertension with flattening of the interventricular septum with systole/diastole consistent with pressure and volume overload with severe RA/RV enlargement and decreased RV systolic function and estimated PASP 94 mm Hg. Bedside bubble study today consistent with likely intracardiac shunt as bubbles appeared on left after 4-5 cycles. Possible component of emphysema/COPD contributing to pulmonary hypertension, but primary etiology likely WHO Group I> There is no evidence of significant parenchymal lung disease on CT that is contributing to his pulmonary hypertension. Labs so far unrevealing, including HIV, acute hepatitis labs, RF, CCP, dsDNA, centromere, scleroderma, Anahi-1, A1AT. F/up KELLY. Pt now s/p LHC/RHC with shunt fraction. Suspect unrepaired intracardiac shunt. CT chest reviewed with radiology previously, no evidence of pulmonary AVM. V/Q scan with very low probability for PE, no evidence of CTEPH. Profound hypoxemia likely due to pulmonary hypertension + R-->L shunt.   RHC/LHC performed 7/3 concerning for severe pulmHTN with normal coronaries and normal CO/CI. 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     - Lasix increased today to IV BID. Agree with diuresis for net (-) fluid balance  -Plan for continuation of diuresis, will discuss with pulmHTN team potential role of PH specific vasodilator therapy  -Structural cardiology following, recs appreciated. MRI Heart if still indicated to better evaluate intracardiac shunt.   - Strict I/O's, close monitoring of kidney function and lytes  - Will need outpatient complete PFTs to evaluate for presence/severity of COPD and rule out restrictive lung disease  - Will also require diagnostic PSG as outpatient to rule out concomitant AUDIE that may be contributing to his pulmonary hypertension  - DVT ppx with enoxaparin  - Discussed with patient at bedside today

## 2025-07-05 NOTE — PROGRESS NOTE ADULT - SUBJECTIVE AND OBJECTIVE BOX
[facility] Attending Follow-up Note     Patient seen and examined at bedside. 7/5/2025    Overnight Events:     events noted     REVIEW OF SYSTEMS:  Constitutional:     [x ] negative [ ] fevers [ ] chills [ ] weight loss [ ] weight gain  HEENT:                  [x ] negative [ ] dry eyes [ ] eye irritation [ ] postnasal drip [ ] nasal congestion  CV:                         [ x] negative  [ ] chest pain [ ] orthopnea [ ] palpitations [ ] murmur  Resp:                     [ x] negative [ ] cough [ ] shortness of breath [ ] dyspnea [ ] wheezing [ ] sputum [ ]hemoptysis  GI:                          [ x] negative [ ] nausea [ ] vomiting [ ] diarrhea [ ] constipation [ ] abd pain [ ] dysphagia   :                        [ x] negative [ ] dysuria [ ] nocturia [ ] hematuria [ ] increased urinary frequency  Musculoskeletal: [ x] negative [ ] back pain [ ] myalgias [ ] arthralgias [ ] fracture  Skin:                       [ x] negative [ ] rash [ ] itch  Neurological:        [x ] negative [ ] headache [ ] dizziness [ ] syncope [ ] weakness [ ] numbness  Psychiatric:           [ x] negative [ ] anxiety [ ] depression  Endocrine:            [ x] negative [ ] diabetes [ ] thyroid problem  Heme/Lymph:      [ x] negative [ ] anemia [ ] bleeding problem  Allergic/Immune: [ x] negative [ ] itchy eyes [ ] nasal discharge [ ] hives [ ] angioedema    [ x] All other systems negative  [ ] Unable to assess ROS due to    Current Meds:  acetaminophen     Tablet .. 975 milliGRAM(s) Oral every 8 hours PRN  calamine/zinc oxide Lotion 1 Application(s) Topical two times a day  enoxaparin Injectable 40 milliGRAM(s) SubCutaneous every 24 hours  furosemide   Injectable 40 milliGRAM(s) IV Push two times a day  oxyCODONE    IR 5 milliGRAM(s) Oral every 6 hours PRN  sildenafil (REVATIO) 10 milliGRAM(s) Oral three times a day  tamsulosin 0.4 milliGRAM(s) Oral at bedtime      PAST MEDICAL & SURGICAL HISTORY:  Former heavy tobacco smoker      Kidney stone      No significant past surgical history          Vitals:  T(F): 97.3 (07-08), Max: 97.5 (07-07)  HR: 69 (07-08) (69 - 71)  BP: 104/64 (07-08) (100/64 - 104/64)  RR: 18 (07-08)  SpO2: 88% (07-08)  I&O's Summary    07 Jul 2025 07:01  -  08 Jul 2025 07:00  --------------------------------------------------------  IN: 200 mL / OUT: 0 mL / NET: 200 mL        Physical Exam:  Appearance: No acute distress  HENT: No JVD   Cardiovascular: RRR, S1/S2, no murmurs  Respiratory: CTABL  Gastrointestinal: soft, NT ND, +BS  Musculoskeletal: No clubbing, no edema   Neurologic: Non-focal  Skin: No rashes, ecchymoses, or cyanosis                          16.5   8.88  )-----------( 169      ( 07 Jul 2025 06:38 )             49.8     07-07    139  |  108  |  24[H]  ----------------------------<  90  3.8   |  16[L]  |  0.95    Ca    8.9      07 Jul 2025 06:39                    Cardiovascular Testings:

## 2025-07-05 NOTE — PROGRESS NOTE ADULT - SUBJECTIVE AND OBJECTIVE BOX
CHIEF COMPLAINT: Kidney stone/dyspnea    Interval Events: Pt seen and examined at bedside today. Ambulating around room, yesterday felt good but today is a little dizzy after showering/shaving.     REVIEW OF SYSTEMS:  Constitutional: No fevers or chills. No weight loss. No fatigue or generalised malaise.  Eyes: No itching or discharge from the eyes  ENT: No ear pain. No ear discharge. No nasal congestion. No post nasal drip. No epistaxis. No throat pain. No sore throat. No difficulty swallowing.   CV: No chest pain. No palpitations. No lightheadedness or dizziness.   Resp: No dyspnea at rest. No dyspnea on exertion. No orthopnea. No wheezing. No cough. No stridor. No sputum production. No chest pain with respiration.  GI: No nausea. No vomiting. No diarrhea.  MSK: No joint pain or pain in any extremities  Integumentary: No skin lesions. No pedal edema.  Neurological: No gross motor weakness. No sensory changes.  [x ] All other systems negative  [ ] Unable to assess ROS because ________    OBJECTIVE:  ICU Vital Signs Last 24 Hrs  T(C): 36.4 (05 Jul 2025 11:26), Max: 36.5 (05 Jul 2025 04:44)  T(F): 97.5 (05 Jul 2025 11:26), Max: 97.7 (05 Jul 2025 04:44)  HR: 69 (05 Jul 2025 14:44) (61 - 88)  BP: 91/59 (05 Jul 2025 11:26) (90/60 - 95/60)  BP(mean): 70 (05 Jul 2025 11:26) (70 - 70)  ABP: --  ABP(mean): --  RR: 20 (05 Jul 2025 14:44) (18 - 20)  SpO2: 90% (05 Jul 2025 14:44) (90% - 98%)    O2 Parameters below as of 05 Jul 2025 14:44  Patient On (Oxygen Delivery Method): nasal cannula  O2 Flow (L/min): 6            07-04 @ 07:01  -  07-05 @ 07:00  --------------------------------------------------------  IN: 0 mL / OUT: 500 mL / NET: -500 mL      CAPILLARY BLOOD GLUCOSE    PHYSICAL EXAM  Constitutional: WDWN, NAD  HEENT: PERRL, EOMI, sclera non-icteric, neck supple, trachea midline, no masses, no JVD, MMM, good dentition  Respiratory: CTA b/l, good air entry b/l, no wheezing, no rhonchi, no rales, without accessory muscle use and no intercostal retractions  Cardiovascular: RRR, normal S1S2, no M/R/G  Gastrointestinal: soft, NTND, no masses palpable, BS normal  Extremities: Warm, well perfused, pulses equal bilateral upper and lower extremities, no edema, no clubbing. Capillary refill <2 sec  Neurological: AAOx3, CN Grossly intact  Skin: Normal temperature, warm, dry    HOSPITAL MEDICATIONS:  MEDICATIONS  (STANDING):  enoxaparin Injectable 40 milliGRAM(s) SubCutaneous every 24 hours  furosemide   Injectable 40 milliGRAM(s) IV Push daily  tamsulosin 0.4 milliGRAM(s) Oral at bedtime    MEDICATIONS  (PRN):  acetaminophen     Tablet .. 975 milliGRAM(s) Oral every 8 hours PRN Temp greater or equal to 38C (100.4F), Mild Pain (1 - 3)  oxyCODONE    IR 5 milliGRAM(s) Oral every 6 hours PRN Severe Pain (7 - 10)      LABS:                        16.1   8.53  )-----------( 163      ( 05 Jul 2025 06:50 )             46.9     07-05    139  |  106  |  18  ----------------------------<  91  3.8   |  17[L]  |  0.93    Ca    8.8      05 Jul 2025 06:51        Urinalysis Basic - ( 05 Jul 2025 06:51 )    Color: x / Appearance: x / SG: x / pH: x  Gluc: 91 mg/dL / Ketone: x  / Bili: x / Urobili: x   Blood: x / Protein: x / Nitrite: x   Leuk Esterase: x / RBC: x / WBC x   Sq Epi: x / Non Sq Epi: x / Bacteria: x            MICROBIOLOGY: Reviewed    RADIOLOGY:  [ X] Reviewed and interpreted by me        PFT: None available    EKG: Reviewed

## 2025-07-06 LAB
HCT VFR BLD CALC: 48.5 % — SIGNIFICANT CHANGE UP (ref 39–50)
HGB BLD-MCNC: 16.4 G/DL — SIGNIFICANT CHANGE UP (ref 13–17)
MCHC RBC-ENTMCNC: 30.7 PG — SIGNIFICANT CHANGE UP (ref 27–34)
MCHC RBC-ENTMCNC: 33.8 G/DL — SIGNIFICANT CHANGE UP (ref 32–36)
MCV RBC AUTO: 90.7 FL — SIGNIFICANT CHANGE UP (ref 80–100)
NRBC # BLD AUTO: 0 K/UL — SIGNIFICANT CHANGE UP (ref 0–0)
NRBC # FLD: 0 K/UL — SIGNIFICANT CHANGE UP (ref 0–0)
NRBC BLD AUTO-RTO: 0 /100 WBCS — SIGNIFICANT CHANGE UP (ref 0–0)
PLATELET # BLD AUTO: 159 K/UL — SIGNIFICANT CHANGE UP (ref 150–400)
PMV BLD: 12.3 FL — SIGNIFICANT CHANGE UP (ref 7–13)
RBC # BLD: 5.35 M/UL — SIGNIFICANT CHANGE UP (ref 4.2–5.8)
RBC # FLD: 13.5 % — SIGNIFICANT CHANGE UP (ref 10.3–14.5)
WBC # BLD: 8.62 K/UL — SIGNIFICANT CHANGE UP (ref 3.8–10.5)
WBC # FLD AUTO: 8.62 K/UL — SIGNIFICANT CHANGE UP (ref 3.8–10.5)

## 2025-07-06 PROCEDURE — 99232 SBSQ HOSP IP/OBS MODERATE 35: CPT

## 2025-07-06 PROCEDURE — 93325 DOPPLER ECHO COLOR FLOW MAPG: CPT | Mod: 26

## 2025-07-06 PROCEDURE — 93308 TTE F-UP OR LMTD: CPT | Mod: 26

## 2025-07-06 PROCEDURE — 93321 DOPPLER ECHO F-UP/LMTD STD: CPT | Mod: 26

## 2025-07-06 RX ADMIN — FUROSEMIDE 40 MILLIGRAM(S): 10 INJECTION INTRAMUSCULAR; INTRAVENOUS at 13:07

## 2025-07-06 RX ADMIN — CALAMINE 8% AND ZINC OXIDE 8% 1 APPLICATION(S): 160 LOTION TOPICAL at 05:49

## 2025-07-06 RX ADMIN — TAMSULOSIN HYDROCHLORIDE 0.4 MILLIGRAM(S): 0.4 CAPSULE ORAL at 21:22

## 2025-07-06 NOTE — PROGRESS NOTE ADULT - ASSESSMENT
55M w/ + smoking hx, known severe pHTN and ASD (sees Dr. Maverick Craven in [place], [place]). He has had multiple LOC in the past and now here for kidney stone and cardiology consulted for severe pHTN     -severe pHTN w/ multiple syncopal episode   -V/Q scan negative for PE   -s/p R/LHC, severe pulm HTN, near systemic BP   -rec pulmonary HTN team eval   -possible transplant eval   -ASD closure pending transplant course       Blaise Jordan MD [facility]  Attending Interventional Cardiologist, [facility].   Avaliable on [employer] Team

## 2025-07-06 NOTE — PROGRESS NOTE ADULT - SUBJECTIVE AND OBJECTIVE BOX
[facility] Attending Follow-up Note     Patient seen and examined at bedside. 7/6/2025    Overnight Events:     events noted     REVIEW OF SYSTEMS:  Constitutional:     [x ] negative [ ] fevers [ ] chills [ ] weight loss [ ] weight gain  HEENT:                  [x ] negative [ ] dry eyes [ ] eye irritation [ ] postnasal drip [ ] nasal congestion  CV:                         [ x] negative  [ ] chest pain [ ] orthopnea [ ] palpitations [ ] murmur  Resp:                     [ x] negative [ ] cough [ ] shortness of breath [ ] dyspnea [ ] wheezing [ ] sputum [ ]hemoptysis  GI:                          [ x] negative [ ] nausea [ ] vomiting [ ] diarrhea [ ] constipation [ ] abd pain [ ] dysphagia   :                        [ x] negative [ ] dysuria [ ] nocturia [ ] hematuria [ ] increased urinary frequency  Musculoskeletal: [ x] negative [ ] back pain [ ] myalgias [ ] arthralgias [ ] fracture  Skin:                       [ x] negative [ ] rash [ ] itch  Neurological:        [x ] negative [ ] headache [ ] dizziness [ ] syncope [ ] weakness [ ] numbness  Psychiatric:           [ x] negative [ ] anxiety [ ] depression  Endocrine:            [ x] negative [ ] diabetes [ ] thyroid problem  Heme/Lymph:      [ x] negative [ ] anemia [ ] bleeding problem  Allergic/Immune: [ x] negative [ ] itchy eyes [ ] nasal discharge [ ] hives [ ] angioedema    [ x] All other systems negative  [ ] Unable to assess ROS due to    Current Meds:  acetaminophen     Tablet .. 975 milliGRAM(s) Oral every 8 hours PRN  calamine/zinc oxide Lotion 1 Application(s) Topical two times a day  enoxaparin Injectable 40 milliGRAM(s) SubCutaneous every 24 hours  furosemide   Injectable 40 milliGRAM(s) IV Push two times a day  oxyCODONE    IR 5 milliGRAM(s) Oral every 6 hours PRN  sildenafil (REVATIO) 10 milliGRAM(s) Oral three times a day  tamsulosin 0.4 milliGRAM(s) Oral at bedtime      PAST MEDICAL & SURGICAL HISTORY:  Former heavy tobacco smoker      Kidney stone      No significant past surgical history          Vitals:  T(F): 97.3 (07-08), Max: 97.5 (07-07)  HR: 69 (07-08) (69 - 71)  BP: 104/64 (07-08) (100/64 - 104/64)  RR: 18 (07-08)  SpO2: 88% (07-08)  I&O's Summary    07 Jul 2025 07:01  -  08 Jul 2025 07:00  --------------------------------------------------------  IN: 200 mL / OUT: 0 mL / NET: 200 mL        Physical Exam:  Appearance: No acute distress  HENT: No JVD   Cardiovascular: RRR, S1/S2, no murmurs  Respiratory: CTABL  Gastrointestinal: soft, NT ND, +BS  Musculoskeletal: No clubbing, no edema   Neurologic: Non-focal  Skin: No rashes, ecchymoses, or cyanosis                          16.5   8.88  )-----------( 169      ( 07 Jul 2025 06:38 )             49.8     07-07    139  |  108  |  24[H]  ----------------------------<  90  3.8   |  16[L]  |  0.95    Ca    8.9      07 Jul 2025 06:39                    Cardiovascular Testings:

## 2025-07-06 NOTE — PROGRESS NOTE ADULT - SUBJECTIVE AND OBJECTIVE BOX
Dylon Rios M.D.  Division of Hospital Medicine   Available via MS Teams    Patient is a 55y old  Male who presents with a chief complaint of abd pain (05 Jul 2025 20:21)      SUBJECTIVE / OVERNIGHT EVENTS:  Seen and examined at the bedside.    Reports that his breathing has improved.    Requiring 5-6 L oxygen on nasal cannula.    2D echocardiogram pending.    Cardiac MRI ordered today      MEDICATIONS  (STANDING):  calamine/zinc oxide Lotion 1 Application(s) Topical two times a day  enoxaparin Injectable 40 milliGRAM(s) SubCutaneous every 24 hours  furosemide   Injectable 40 milliGRAM(s) IV Push daily  tamsulosin 0.4 milliGRAM(s) Oral at bedtime    MEDICATIONS  (PRN):  acetaminophen     Tablet .. 975 milliGRAM(s) Oral every 8 hours PRN Temp greater or equal to 38C (100.4F), Mild Pain (1 - 3)  oxyCODONE    IR 5 milliGRAM(s) Oral every 6 hours PRN Severe Pain (7 - 10)      CAPILLARY BLOOD GLUCOSE        I&O's Summary    05 Jul 2025 07:01  -  06 Jul 2025 07:00  --------------------------------------------------------  IN: 350 mL / OUT: 600 mL / NET: -250 mL        PHYSICAL EXAM:  Vital Signs Last 24 Hrs  T(C): 36.5 (06 Jul 2025 11:11), Max: 36.5 (06 Jul 2025 00:12)  T(F): 97.7 (06 Jul 2025 11:11), Max: 97.7 (06 Jul 2025 00:12)  HR: 82 (06 Jul 2025 11:11) (58 - 82)  BP: 96/67 (06 Jul 2025 11:11) (91/60 - 100/68)  BP(mean): 76 (06 Jul 2025 11:11) (76 - 76)  RR: 18 (06 Jul 2025 09:25) (18 - 20)  SpO2: 94% (06 Jul 2025 11:11) (86% - 94%)    Parameters below as of 06 Jul 2025 11:11  Patient On (Oxygen Delivery Method): nasal cannula  O2 Flow (L/min): 3      CONSTITUTIONAL: NAD, well-developed  RESPIRATORY: Normal respiratory effort; lungs are clear to auscultation bilaterally  CARDIOVASCULAR: Regular rate and rhythm, normal S1 and S2, no murmur/rub/gallop; No lower extremity edema; Peripheral pulses are 2+ bilaterally  ABDOMEN: Nontender to palpation, normoactive bowel sounds, no rebound/guarding; No hepatosplenomegaly  MUSCLOSKELETAL: no clubbing or cyanosis of digits; no joint swelling or tenderness to palpation  PSYCH: A+O to person, place, and time; affect appropriate    LABS:                        16.4   8.62  )-----------( 159      ( 06 Jul 2025 06:47 )             48.5     07-05    139  |  106  |  18  ----------------------------<  91  3.8   |  17[L]  |  0.93    Ca    8.8      05 Jul 2025 06:51            Urinalysis Basic - ( 05 Jul 2025 06:51 )    Color: x / Appearance: x / SG: x / pH: x  Gluc: 91 mg/dL / Ketone: x  / Bili: x / Urobili: x   Blood: x / Protein: x / Nitrite: x   Leuk Esterase: x / RBC: x / WBC x   Sq Epi: x / Non Sq Epi: x / Bacteria: x          RADIOLOGY & ADDITIONAL TESTS:  Results Reviewed:   Imaging Personally Reviewed:  Electrocardiogram Personally Reviewed:    COORDINATION OF CARE:  Care Discussed with Consultants/Other Providers [Y/N]:  Prior or Outpatient Records Reviewed [Y/N]:

## 2025-07-07 LAB
ANION GAP SERPL CALC-SCNC: 15 MMOL/L — SIGNIFICANT CHANGE UP (ref 5–17)
BUN SERPL-MCNC: 24 MG/DL — HIGH (ref 7–23)
CALCIUM SERPL-MCNC: 8.9 MG/DL — SIGNIFICANT CHANGE UP (ref 8.4–10.5)
CHLORIDE SERPL-SCNC: 108 MMOL/L — SIGNIFICANT CHANGE UP (ref 96–108)
CO2 SERPL-SCNC: 16 MMOL/L — LOW (ref 22–31)
CREAT SERPL-MCNC: 0.95 MG/DL — SIGNIFICANT CHANGE UP (ref 0.5–1.3)
EGFR: 94 ML/MIN/1.73M2 — SIGNIFICANT CHANGE UP
EGFR: 94 ML/MIN/1.73M2 — SIGNIFICANT CHANGE UP
GLUCOSE SERPL-MCNC: 90 MG/DL — SIGNIFICANT CHANGE UP (ref 70–99)
HCT VFR BLD CALC: 49.8 % — SIGNIFICANT CHANGE UP (ref 39–50)
HGB BLD-MCNC: 16.5 G/DL — SIGNIFICANT CHANGE UP (ref 13–17)
MCHC RBC-ENTMCNC: 29.9 PG — SIGNIFICANT CHANGE UP (ref 27–34)
MCHC RBC-ENTMCNC: 33.1 G/DL — SIGNIFICANT CHANGE UP (ref 32–36)
MCV RBC AUTO: 90.2 FL — SIGNIFICANT CHANGE UP (ref 80–100)
NRBC # BLD AUTO: 0 K/UL — SIGNIFICANT CHANGE UP (ref 0–0)
NRBC # FLD: 0 K/UL — SIGNIFICANT CHANGE UP (ref 0–0)
NRBC BLD AUTO-RTO: 0 /100 WBCS — SIGNIFICANT CHANGE UP (ref 0–0)
PLATELET # BLD AUTO: 169 K/UL — SIGNIFICANT CHANGE UP (ref 150–400)
PMV BLD: 12.1 FL — SIGNIFICANT CHANGE UP (ref 7–13)
POTASSIUM SERPL-MCNC: 3.8 MMOL/L — SIGNIFICANT CHANGE UP (ref 3.5–5.3)
POTASSIUM SERPL-SCNC: 3.8 MMOL/L — SIGNIFICANT CHANGE UP (ref 3.5–5.3)
RBC # BLD: 5.52 M/UL — SIGNIFICANT CHANGE UP (ref 4.2–5.8)
RBC # FLD: 13.6 % — SIGNIFICANT CHANGE UP (ref 10.3–14.5)
SODIUM SERPL-SCNC: 139 MMOL/L — SIGNIFICANT CHANGE UP (ref 135–145)
WBC # BLD: 8.88 K/UL — SIGNIFICANT CHANGE UP (ref 3.8–10.5)
WBC # FLD AUTO: 8.88 K/UL — SIGNIFICANT CHANGE UP (ref 3.8–10.5)

## 2025-07-07 PROCEDURE — 99233 SBSQ HOSP IP/OBS HIGH 50: CPT

## 2025-07-07 PROCEDURE — 75561 CARDIAC MRI FOR MORPH W/DYE: CPT | Mod: 26

## 2025-07-07 RX ORDER — SILDENAFIL 50 MG/1
10 TABLET, FILM COATED ORAL THREE TIMES A DAY
Refills: 0 | Status: DISCONTINUED | OUTPATIENT
Start: 2025-07-07 | End: 2025-07-08

## 2025-07-07 RX ORDER — FUROSEMIDE 10 MG/ML
40 INJECTION INTRAMUSCULAR; INTRAVENOUS ONCE
Refills: 0 | Status: COMPLETED | OUTPATIENT
Start: 2025-07-07 | End: 2025-07-07

## 2025-07-07 RX ORDER — FUROSEMIDE 10 MG/ML
40 INJECTION INTRAMUSCULAR; INTRAVENOUS
Refills: 0 | Status: DISCONTINUED | OUTPATIENT
Start: 2025-07-07 | End: 2025-07-08

## 2025-07-07 RX ADMIN — FUROSEMIDE 40 MILLIGRAM(S): 10 INJECTION INTRAMUSCULAR; INTRAVENOUS at 23:23

## 2025-07-07 RX ADMIN — TAMSULOSIN HYDROCHLORIDE 0.4 MILLIGRAM(S): 0.4 CAPSULE ORAL at 22:49

## 2025-07-07 RX ADMIN — SILDENAFIL 10 MILLIGRAM(S): 50 TABLET, FILM COATED ORAL at 22:49
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
## 2025-07-07 NOTE — PROGRESS NOTE ADULT - PROBLEM SELECTOR PLAN 3
- nutrition consulted  - concern for occult malignancy with recent weight loss, significant smoking history and scattered lung nodules up to 5mm.  Other age appropriate cancer screening - colonoscopy this year s/p polypectomy (benign per patient), unsure if PSA is 2
- nutrition consulted  - concern for occult malignancy with recent weight loss, significant smoking history and scattered lung nodules up to 5mm.  Other age appropriate cancer screening - colonoscopy this year s/p polypectomy (benign per patient), unsure if PSA was checked, but denies urinary symptoms
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
## 2025-07-07 NOTE — PROGRESS NOTE ADULT - NUTRITIONAL ASSESSMENT
This patient has been assessed with a concern for Malnutrition and has been determined to have a diagnosis/diagnoses of Underweight (BMI < 19).    This patient is being managed with:   Diet Regular-  Entered: Jun 29 2025  7:54AM  

## 2025-07-07 NOTE — PROGRESS NOTE ADULT - PROBLEM SELECTOR PLAN 4
UA with microscopic hematuria, CT finding of 4mm ureter stone with associated L sided hydronephrosis  - suspect passing kidney stone causing severe left sided abdominal pain  - Urology consult appreciated  - IVF and pain control, strain urine for stone
UA with microscopic hematuria, CT finding of 4mm ureter stone with associated L sided hydronephrosis  - suspect passing kidney stone causing severe left sided abdominal pain  - Urology consult appreciated  - IVF and pain control, strain urine for stone  - will monitor renal function
UA with microscopic hematuria, CT finding of 4mm ureter stone with associated L sided hydronephrosis  - suspect passing kidney stone causing severe left sided abdominal pain  - Urology consult appreciated
UA with microscopic hematuria, CT finding of 4mm ureter stone with associated L sided hydronephrosis  - suspect passing kidney stone causing severe left sided abdominal pain  - Urology consult appreciated
UA with microscopic hematuria, CT finding of 4mm ureter stone with associated L sided hydronephrosis  - suspect passing kidney stone causing severe left sided abdominal pain  - Urology consult appreciated  - IVF and pain control, strain urine for stone  - will monitor renal function and repeat US
UA with microscopic hematuria, CT finding of 4mm ureter stone with associated L sided hydronephrosis  - suspect passing kidney stone causing severe left sided abdominal pain  - Urology consult appreciated  - IVF and pain control, strain urine for stone
UA with microscopic hematuria, CT finding of 4mm ureter stone with associated L sided hydronephrosis  - suspect passing kidney stone causing severe left sided abdominal pain  - Urology consult appreciated  - IVF and pain control, strain urine for stone
UA with microscopic hematuria, CT finding of 4mm ureter stone with associated L sided hydronephrosis  - suspect passing kidney stone causing severe left sided abdominal pain  - Urology consult appreciated  - IVF and pain control, strain urine for stone  - will monitor renal function and repeat US

## 2025-07-07 NOTE — PROGRESS NOTE ADULT - PROBLEM SELECTOR PLAN 5
Lovenox sc
Lovenox sc    D/w family 7/7 updated on full plan of care
Lovenox sc
Lovenox sc
Lovenox sc    D/w wife 7/3 updated on full plan of care
Lovenox sc

## 2025-07-07 NOTE — PROGRESS NOTE ADULT - PROBLEM SELECTOR PLAN 1
H/H 18/53, CTA suboptimal study for PE but showed emphysema and Mild dependent and linear atelectasis in the bilateral lower lobes and right middle lobe. 2 mm nodule in the right upper lobe (5:78). Scattered calcified granulomas. Enlarged right-sided heart chambers and dilated central pulmonary arteries suggestive of pulmonary hypertension.  Will get Pulmonary consult for further evaluation given profound hypoxia on RA  - CTA negative for PE and D Dimer negative  - TTE pending, reportedly has RVE and LIUDMILA   - Primary Cardiologist is Dr Maverick Craven ; [phone]
CTA suboptimal study for PE but showed emphysema and Mild dependent and linear atelectasis in the bilateral lower lobes and right middle lobe. 2 mm nodule in the right upper lobe (5:78). Scattered calcified granulomas. Enlarged right-sided heart chambers and dilated central pulmonary arteries suggestive of pulmonary hypertension.  - CTA negative for PE and D Dimer negative  - TTE with bubble study pending, reportedly has RVE and LIUDMILA. cMRI as per cardiology  - V/Q scan low prob PE   - SP cath. TTE pending  - Sating 94% 6L NC  - C/w lasix 40mg qd
CTA suboptimal study for PE but showed emphysema and Mild dependent and linear atelectasis in the bilateral lower lobes and right middle lobe. 2 mm nodule in the right upper lobe (5:78). Scattered calcified granulomas. Enlarged right-sided heart chambers and dilated central pulmonary arteries suggestive of pulmonary hypertension.  - CTA negative for PE and D Dimer negative  - TTE with bubble study pending, reportedly has RVE and LIUDMILA   - V/Q scan low prob PE   - D/w cards 7/3 plan for L/RHC today   - Sating 94% 6L NC  - C/w lasix 40mg qd
CTA suboptimal study for PE but showed emphysema and Mild dependent and linear atelectasis in the bilateral lower lobes and right middle lobe. 2 mm nodule in the right upper lobe (5:78). Scattered calcified granulomas. Enlarged right-sided heart chambers and dilated central pulmonary arteries suggestive of pulmonary hypertension.  - CTA negative for PE and D Dimer negative  - TTE with bubble study pending. cMRI as per cardiology  - V/Q scan low prob PE   - SP cath. TTE pending  - Sating 94% 6L NC  - C/w lasix 40mg qd
H/H 18/53, CTA suboptimal study for PE but showed emphysema and Mild dependent and linear atelectasis in the bilateral lower lobes and right middle lobe. 2 mm nodule in the right upper lobe (5:78). Scattered calcified granulomas. Enlarged right-sided heart chambers and dilated central pulmonary arteries suggestive of pulmonary hypertension.  - CTA negative for PE and D Dimer negative  - TTE pending, reportedly has RVE and LIUDMILA   - Check V/Q scan   - Cards eval called
CTA suboptimal study for PE but showed emphysema and Mild dependent and linear atelectasis in the bilateral lower lobes and right middle lobe. 2 mm nodule in the right upper lobe (5:78). Scattered calcified granulomas. Enlarged right-sided heart chambers and dilated central pulmonary arteries suggestive of pulmonary hypertension.  - CTA negative for PE and D Dimer negative  - TTE with bubble study with interatrial shunt  - Cardiac MRI with pulm HTN  - V/Q scan low prob PE   - SP cath  - C/w IV lasix 40mg bid   - Pending cards and pulm f/u
H/H 18/53, CTA suboptimal study for PE but showed emphysema - suspect chronic hypoxia leading to erythrocytosis with history of extensive smoking (35+ years, quit 1 month ago) consistent with symptoms of progressive DAVIS for the past year and recent TTE showing LIUDMILA/RVE (likely pulm HTN as well)  - CTA negative for PE  - TTE pending  - Cardiology consulted  - Primary Cardiologist is Dr Maverick Craven ; [phone]
CTA suboptimal study for PE but showed emphysema and Mild dependent and linear atelectasis in the bilateral lower lobes and right middle lobe. 2 mm nodule in the right upper lobe (5:78). Scattered calcified granulomas. Enlarged right-sided heart chambers and dilated central pulmonary arteries suggestive of pulmonary hypertension.  - CTA negative for PE and D Dimer negative  - TTE with bubble study pending, reportedly has RVE and LIUDMILA   - V/Q scan low prob PE   - SP cath. TTE pending  - Sating 94% 6L NC  - C/w lasix 40mg qd

## 2025-07-07 NOTE — PROGRESS NOTE ADULT - SUBJECTIVE AND OBJECTIVE BOX
Interval Events:      REVIEW OF SYSTEMS:  Negative except as documented above.      OBJECTIVE:  ICU Vital Signs Last 24 Hrs  T(C): 36.2 (07 Jul 2025 07:30), Max: 37 (07 Jul 2025 05:05)  T(F): 97.2 (07 Jul 2025 07:30), Max: 98.6 (07 Jul 2025 05:05)  HR: 79 (07 Jul 2025 07:30) (75 - 84)  BP: 96/67 (07 Jul 2025 07:30) (90/60 - 96/67)  BP(mean): 77 (07 Jul 2025 07:30) (70 - 77)  ABP: --  ABP(mean): --  RR: 18 (07 Jul 2025 07:30) (18 - 18)  SpO2: 88% (07 Jul 2025 07:30) (75% - 95%)    O2 Parameters below as of 07 Jul 2025 07:30  Patient On (Oxygen Delivery Method): nasal cannula              CAPILLARY BLOOD GLUCOSE          PHYSICAL EXAM:  General: NAD  HEENT:  EOMI, sclera anicteric, moist mucus membranes  Neck: supple  Cardiovascular: RRR  Respiratory: CTAB, no wheezes, crackles, or rhonci  Abdomen: soft, nontender  Extremities: warm and well perfused, no edema, no clubbing  Skin: no rashes  Neurological: no focal deficits    HOSPITAL MEDICATIONS:  MEDICATIONS  (STANDING):  calamine/zinc oxide Lotion 1 Application(s) Topical two times a day  enoxaparin Injectable 40 milliGRAM(s) SubCutaneous every 24 hours  furosemide   Injectable 40 milliGRAM(s) IV Push two times a day  tamsulosin 0.4 milliGRAM(s) Oral at bedtime    MEDICATIONS  (PRN):  acetaminophen     Tablet .. 975 milliGRAM(s) Oral every 8 hours PRN Temp greater or equal to 38C (100.4F), Mild Pain (1 - 3)  oxyCODONE    IR 5 milliGRAM(s) Oral every 6 hours PRN Severe Pain (7 - 10)      LABS:                        16.5   8.88  )-----------( 169      ( 07 Jul 2025 06:38 )             49.8     Hgb Trend: 16.5<--, 16.4<--, 16.1<--, 15.8<--, 16.3<--  07-07    139  |  108  |  24[H]  ----------------------------<  90  3.8   |  16[L]  |  0.95    Ca    8.9      07 Jul 2025 06:39      Creatinine Trend: 0.95<--, 0.93<--, 0.87<--, 1.06<--, 1.26<--, 1.26<--    Urinalysis Basic - ( 07 Jul 2025 06:39 )    Color: x / Appearance: x / SG: x / pH: x  Gluc: 90 mg/dL / Ketone: x  / Bili: x / Urobili: x   Blood: x / Protein: x / Nitrite: x   Leuk Esterase: x / RBC: x / WBC x   Sq Epi: x / Non Sq Epi: x / Bacteria: x            MICROBIOLOGY:       RADIOLOGY:  [x] Reviewed and interpreted by me

## 2025-07-07 NOTE — PROGRESS NOTE ADULT - SUBJECTIVE AND OBJECTIVE BOX
Patient is a 55y old  Male who presents with a chief complaint of abd pain (06 Jul 2025 13:45)      SUBJECTIVE / OVERNIGHT EVENTS: pt feels ok. wants to go home, denies cp, sob    MEDICATIONS  (STANDING):  calamine/zinc oxide Lotion 1 Application(s) Topical two times a day  enoxaparin Injectable 40 milliGRAM(s) SubCutaneous every 24 hours  furosemide   Injectable 40 milliGRAM(s) IV Push two times a day  tamsulosin 0.4 milliGRAM(s) Oral at bedtime    MEDICATIONS  (PRN):  acetaminophen     Tablet .. 975 milliGRAM(s) Oral every 8 hours PRN Temp greater or equal to 38C (100.4F), Mild Pain (1 - 3)  oxyCODONE    IR 5 milliGRAM(s) Oral every 6 hours PRN Severe Pain (7 - 10)        CAPILLARY BLOOD GLUCOSE        I&O's Summary      PHYSICAL EXAM:  GENERAL: NAD, well-developed  HEAD:  Atraumatic, Normocephalic  EYES: conjunctiva and sclera clear  NECK: No JVD  CHEST/LUNG: Clear to auscultation bilaterally; No wheeze  HEART: Regular rate and rhythm; S1S2  ABDOMEN: Soft, Nontender, Nondistended; Bowel sounds present  EXTREMITIES:  2+ Peripheral Pulses, No clubbing, cyanosis, or edema  PSYCH: AAOx3      LABS:                        16.5   8.88  )-----------( 169      ( 07 Jul 2025 06:38 )             49.8     07-07    139  |  108  |  24[H]  ----------------------------<  90  3.8   |  16[L]  |  0.95    Ca    8.9      07 Jul 2025 06:39            Urinalysis Basic - ( 07 Jul 2025 06:39 )    Color: x / Appearance: x / SG: x / pH: x  Gluc: 90 mg/dL / Ketone: x  / Bili: x / Urobili: x   Blood: x / Protein: x / Nitrite: x   Leuk Esterase: x / RBC: x / WBC x   Sq Epi: x / Non Sq Epi: x / Bacteria: x        RADIOLOGY & ADDITIONAL TESTS:    Imaging Personally Reviewed:    Consultant(s) Notes Reviewed:      Care Discussed with Consultants/Other Providers:

## 2025-07-07 NOTE — PROGRESS NOTE ADULT - PROBLEM SELECTOR PROBLEM 1
Acute on chronic hypoxic respiratory failure

## 2025-07-07 NOTE — PROGRESS NOTE ADULT - SUBJECTIVE AND OBJECTIVE BOX
[facility] Attending Follow-up Note     Patient seen and examined at bedside. 7/7/2025    Overnight Events:     events noted     REVIEW OF SYSTEMS:  Constitutional:     [x ] negative [ ] fevers [ ] chills [ ] weight loss [ ] weight gain  HEENT:                  [x ] negative [ ] dry eyes [ ] eye irritation [ ] postnasal drip [ ] nasal congestion  CV:                         [ x] negative  [ ] chest pain [ ] orthopnea [ ] palpitations [ ] murmur  Resp:                     [ x] negative [ ] cough [ ] shortness of breath [ ] dyspnea [ ] wheezing [ ] sputum [ ]hemoptysis  GI:                          [ x] negative [ ] nausea [ ] vomiting [ ] diarrhea [ ] constipation [ ] abd pain [ ] dysphagia   :                        [ x] negative [ ] dysuria [ ] nocturia [ ] hematuria [ ] increased urinary frequency  Musculoskeletal: [ x] negative [ ] back pain [ ] myalgias [ ] arthralgias [ ] fracture  Skin:                       [ x] negative [ ] rash [ ] itch  Neurological:        [x ] negative [ ] headache [ ] dizziness [ ] syncope [ ] weakness [ ] numbness  Psychiatric:           [ x] negative [ ] anxiety [ ] depression  Endocrine:            [ x] negative [ ] diabetes [ ] thyroid problem  Heme/Lymph:      [ x] negative [ ] anemia [ ] bleeding problem  Allergic/Immune: [ x] negative [ ] itchy eyes [ ] nasal discharge [ ] hives [ ] angioedema    [ x] All other systems negative  [ ] Unable to assess ROS due to    Current Meds:  acetaminophen     Tablet .. 975 milliGRAM(s) Oral every 8 hours PRN  calamine/zinc oxide Lotion 1 Application(s) Topical two times a day  enoxaparin Injectable 40 milliGRAM(s) SubCutaneous every 24 hours  furosemide   Injectable 40 milliGRAM(s) IV Push two times a day  oxyCODONE    IR 5 milliGRAM(s) Oral every 6 hours PRN  sildenafil (REVATIO) 10 milliGRAM(s) Oral three times a day  tamsulosin 0.4 milliGRAM(s) Oral at bedtime      PAST MEDICAL & SURGICAL HISTORY:  Former heavy tobacco smoker      Kidney stone      No significant past surgical history          Vitals:  T(F): 97.3 (07-08), Max: 97.5 (07-07)  HR: 69 (07-08) (69 - 71)  BP: 104/64 (07-08) (100/64 - 104/64)  RR: 18 (07-08)  SpO2: 88% (07-08)  I&O's Summary    07 Jul 2025 07:01  -  08 Jul 2025 07:00  --------------------------------------------------------  IN: 200 mL / OUT: 0 mL / NET: 200 mL        Physical Exam:  Appearance: No acute distress  HENT: No JVD   Cardiovascular: RRR, S1/S2, no murmurs  Respiratory: CTABL  Gastrointestinal: soft, NT ND, +BS  Musculoskeletal: No clubbing, no edema   Neurologic: Non-focal  Skin: No rashes, ecchymoses, or cyanosis                          16.5   8.88  )-----------( 169      ( 07 Jul 2025 06:38 )             49.8     07-07    139  |  108  |  24[H]  ----------------------------<  90  3.8   |  16[L]  |  0.95    Ca    8.9      07 Jul 2025 06:39                    Cardiovascular Testings:

## 2025-07-07 NOTE — PROGRESS NOTE ADULT - PROBLEM SELECTOR PROBLEM 4
Kidney stone

## 2025-07-07 NOTE — PROGRESS NOTE ADULT - PROBLEM SELECTOR PROBLEM 3
FTT (failure to thrive) in adult

## 2025-07-08 ENCOUNTER — TRANSCRIPTION ENCOUNTER (OUTPATIENT)
Age: 55
End: 2025-07-08

## 2025-07-08 VITALS
DIASTOLIC BLOOD PRESSURE: 64 MMHG | TEMPERATURE: 97 F | WEIGHT: 131.84 LBS | SYSTOLIC BLOOD PRESSURE: 104 MMHG | HEART RATE: 69 BPM | RESPIRATION RATE: 18 BRPM | OXYGEN SATURATION: 88 %

## 2025-07-08 LAB — SCHISTOSOMA IGG SER-ACNC: <1 — SIGNIFICANT CHANGE UP

## 2025-07-08 PROCEDURE — 85730 THROMBOPLASTIN TIME PARTIAL: CPT

## 2025-07-08 PROCEDURE — 83690 ASSAY OF LIPASE: CPT

## 2025-07-08 PROCEDURE — 82435 ASSAY OF BLOOD CHLORIDE: CPT

## 2025-07-08 PROCEDURE — 86900 BLOOD TYPING SEROLOGIC ABO: CPT

## 2025-07-08 PROCEDURE — 80048 BASIC METABOLIC PNL TOTAL CA: CPT

## 2025-07-08 PROCEDURE — 82103 ALPHA-1-ANTITRYPSIN TOTAL: CPT

## 2025-07-08 PROCEDURE — 99291 CRITICAL CARE FIRST HOUR: CPT | Mod: 25

## 2025-07-08 PROCEDURE — 86255 FLUORESCENT ANTIBODY SCREEN: CPT

## 2025-07-08 PROCEDURE — 93880 EXTRACRANIAL BILAT STUDY: CPT

## 2025-07-08 PROCEDURE — 84100 ASSAY OF PHOSPHORUS: CPT

## 2025-07-08 PROCEDURE — 75561 CARDIAC MRI FOR MORPH W/DYE: CPT

## 2025-07-08 PROCEDURE — G0103: CPT

## 2025-07-08 PROCEDURE — 70450 CT HEAD/BRAIN W/O DYE: CPT

## 2025-07-08 PROCEDURE — 86901 BLOOD TYPING SEROLOGIC RH(D): CPT

## 2025-07-08 PROCEDURE — 85018 HEMOGLOBIN: CPT

## 2025-07-08 PROCEDURE — 93321 DOPPLER ECHO F-UP/LMTD STD: CPT

## 2025-07-08 PROCEDURE — 82803 BLOOD GASES ANY COMBINATION: CPT

## 2025-07-08 PROCEDURE — 86431 RHEUMATOID FACTOR QUANT: CPT

## 2025-07-08 PROCEDURE — 78582 LUNG VENTILAT&PERFUS IMAGING: CPT

## 2025-07-08 PROCEDURE — 93308 TTE F-UP OR LMTD: CPT

## 2025-07-08 PROCEDURE — 99233 SBSQ HOSP IP/OBS HIGH 50: CPT

## 2025-07-08 PROCEDURE — 93325 DOPPLER ECHO COLOR FLOW MAPG: CPT

## 2025-07-08 PROCEDURE — 83605 ASSAY OF LACTIC ACID: CPT

## 2025-07-08 PROCEDURE — C1769: CPT

## 2025-07-08 PROCEDURE — 74177 CT ABD & PELVIS W/CONTRAST: CPT

## 2025-07-08 PROCEDURE — C1887: CPT

## 2025-07-08 PROCEDURE — A9540: CPT

## 2025-07-08 PROCEDURE — 80053 COMPREHEN METABOLIC PANEL: CPT

## 2025-07-08 PROCEDURE — 85379 FIBRIN DEGRADATION QUANT: CPT

## 2025-07-08 PROCEDURE — 82947 ASSAY GLUCOSE BLOOD QUANT: CPT

## 2025-07-08 PROCEDURE — 82330 ASSAY OF CALCIUM: CPT

## 2025-07-08 PROCEDURE — 81001 URINALYSIS AUTO W/SCOPE: CPT

## 2025-07-08 PROCEDURE — 93970 EXTREMITY STUDY: CPT

## 2025-07-08 PROCEDURE — 85014 HEMATOCRIT: CPT

## 2025-07-08 PROCEDURE — 83735 ASSAY OF MAGNESIUM: CPT

## 2025-07-08 PROCEDURE — 93306 TTE W/DOPPLER COMPLETE: CPT

## 2025-07-08 PROCEDURE — A9585: CPT

## 2025-07-08 PROCEDURE — 87389 HIV-1 AG W/HIV-1&-2 AB AG IA: CPT

## 2025-07-08 PROCEDURE — 85027 COMPLETE CBC AUTOMATED: CPT

## 2025-07-08 PROCEDURE — 85610 PROTHROMBIN TIME: CPT

## 2025-07-08 PROCEDURE — 86225 DNA ANTIBODY NATIVE: CPT

## 2025-07-08 PROCEDURE — 80074 ACUTE HEPATITIS PANEL: CPT

## 2025-07-08 PROCEDURE — 96375 TX/PRO/DX INJ NEW DRUG ADDON: CPT

## 2025-07-08 PROCEDURE — 36415 COLL VENOUS BLD VENIPUNCTURE: CPT

## 2025-07-08 PROCEDURE — 84132 ASSAY OF SERUM POTASSIUM: CPT

## 2025-07-08 PROCEDURE — 71275 CT ANGIOGRAPHY CHEST: CPT

## 2025-07-08 PROCEDURE — 84295 ASSAY OF SERUM SODIUM: CPT

## 2025-07-08 PROCEDURE — C1894: CPT

## 2025-07-08 PROCEDURE — 85025 COMPLETE CBC W/AUTO DIFF WBC: CPT

## 2025-07-08 PROCEDURE — 71045 X-RAY EXAM CHEST 1 VIEW: CPT

## 2025-07-08 PROCEDURE — A9567: CPT

## 2025-07-08 PROCEDURE — 93005 ELECTROCARDIOGRAM TRACING: CPT

## 2025-07-08 PROCEDURE — 93456 R HRT CORONARY ARTERY ANGIO: CPT

## 2025-07-08 PROCEDURE — 86200 CCP ANTIBODY: CPT

## 2025-07-08 PROCEDURE — 86235 NUCLEAR ANTIGEN ANTIBODY: CPT

## 2025-07-08 PROCEDURE — 86682 HELMINTH ANTIBODY: CPT

## 2025-07-08 PROCEDURE — 96374 THER/PROPH/DIAG INJ IV PUSH: CPT

## 2025-07-08 PROCEDURE — 86038 ANTINUCLEAR ANTIBODIES: CPT

## 2025-07-08 PROCEDURE — 83880 ASSAY OF NATRIURETIC PEPTIDE: CPT

## 2025-07-08 PROCEDURE — 84484 ASSAY OF TROPONIN QUANT: CPT

## 2025-07-08 PROCEDURE — 86850 RBC ANTIBODY SCREEN: CPT

## 2025-07-08 PROCEDURE — 76770 US EXAM ABDO BACK WALL COMP: CPT

## 2025-07-08 RX ORDER — TAMSULOSIN HYDROCHLORIDE 0.4 MG/1
1 CAPSULE ORAL
Qty: 14 | Refills: 0
Start: 2025-07-08 | End: 2025-07-21

## 2025-07-08 RX ORDER — SILDENAFIL 50 MG/1
0.5 TABLET, FILM COATED ORAL
Qty: 21 | Refills: 0
Start: 2025-07-08 | End: 2025-07-21

## 2025-07-08 RX ORDER — FUROSEMIDE 10 MG/ML
1 INJECTION INTRAMUSCULAR; INTRAVENOUS
Qty: 14 | Refills: 0
Start: 2025-07-08 | End: 2025-07-21

## 2025-07-08 RX ADMIN — SILDENAFIL 10 MILLIGRAM(S): 50 TABLET, FILM COATED ORAL at 06:19

## 2025-07-08 NOTE — PROGRESS NOTE ADULT - ASSESSMENT
55M w/ + smoking hx, known severe pHTN and ASD (sees Dr. Maverick Craven in [place], [place]). He has had multiple LOC in the past and now here for kidney stone and cardiology consulted for severe pHTN     -severe pHTN w/ multiple syncopal episode   -V/Q scan negative for PE   -s/p R/LHC, severe pulm HTN, near systemic BP   -rec pulmonary HTN team eval   -possible transplant eval   -ASD closure pending transplant course       Blaise Jordan MD [facility]  Attending Interventional Cardiologist, [facility].   Avaliable on [employer] Team

## 2025-07-08 NOTE — PROVIDER CONTACT NOTE (OTHER) - RECOMMENDATIONS
assessed the pt and vitals taken
pain meds. pt only on PRN Tylenol PO
pain mgt
See the pt at the bedside

## 2025-07-08 NOTE — DISCHARGE NOTE PROVIDER - NSDCMRMEDTOKEN_GEN_ALL_CORE_FT
furosemide 20 mg oral tablet: 1 tab(s) orally once a day  sildenafil 20 mg oral tablet: 0.5 tab(s) orally 3 times a day if your  systolic  blood pressure is less than  90 , please stop the medication and  seek medical attention  as soon as possible  tamsulosin 0.4 mg oral capsule: 1 cap(s) orally once a day (at bedtime)

## 2025-07-08 NOTE — DISCHARGE NOTE PROVIDER - DETAILS OF MALNUTRITION DIAGNOSIS/DIAGNOSES
This patient has been assessed with a concern for Malnutrition and was treated during this hospitalization for the following Nutrition diagnosis/diagnoses:     -  07/01/2025: Underweight (BMI < 19)

## 2025-07-08 NOTE — PROVIDER CONTACT NOTE (OTHER) - ASSESSMENT
pt is upset about the pain not controlled. pt A&O x4, pt is hemodynamically stable. BP 94/60, hr 50 pox 94% on High flow N/C 50%.
pt A&O x4, c/o severe Left flank pain. BP 92/58, hr 50. pt asymptomatic except pain.
pt A&O x4, denies cp/sob, pt asymptomatic. denies cp/sob. pt remains hemodynamically stable. SB 50's and briefly teri to 46 while pt asleep.
pt is requesting to sign out against medical advice. I have explained to him the concern for leaving with hypoxia down to 87% while ambulating on RA  - refused further testing as O2 sat as it was dropping while ambulating on RA

## 2025-07-08 NOTE — PROGRESS NOTE ADULT - ASSESSMENT
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 Found to have hypoxemia to 70% on room air requiring high flow nasal cannula +/- 100% NRB. CTPA with no evidence of PE, but with dilated pulmonary artery, RA/RV enlargement, RV thickening, reflux of contrast into the IVC, and hepatic congestion all suggestive of severe pulmonary hypertension. Also with significant emphysema on imaging. Found on echo to have severe pulmonary hypertension with flattening of the interventricular septum with systole/diastole consistent with pressure and volume overload with severe RA/RV enlargement and decreased RV systolic function and estimated PASP 94 mm Hg.     #pHTN pre + post capillary: Possible component of emphysema/COPD contributing to pulmonary hypertension, but primary etiology likely WHO Group (DPG > 7mmHg)   #Former Smoker with Emphysema  - RHC/LHC performed 7/3 concerning for severe pulmHTN with normal coronaries and normal CO/CI. Ao: 111/74/81, /13/58, /40/71, PCWP, 28/24/22, RA 15/13/110, TVR 2178, PVR [id number], venO2 shunt 60 c/w combined pre and post capillary pulmonary HTN.   - Continue Sildenafil 10mg TID - please hold for BP below 90/60mmHg   - Pulmonary Transplant Consult   - TTE with bubble with intracardiac shunt positive but with negative cMRI for ASD or VSD - suspect PFO that opened temporarily   - Obtain bedside PFTs with RT and 6MWT   - CT chest reviewed with radiology previously, no evidence of pulmonary AVM  - Will need home O2 on ambulation   - V/Q scan with very low probability for PE, no evidence of CTEPH  - Labs unrevealing, including HIV, acute hepatitis labs, RF, CCP, dsDNA, centromere, scleroderma, Anahi-1, A1AT. F/up KELLY  - Will also require diagnostic PSG as outpatient to rule out concomitant AUDIE that may be contributing to his pulmonary hypertension  - DVT ppx with enoxaparin

## 2025-07-08 NOTE — DISCHARGE NOTE PROVIDER - NSDCFUADDAPPT_GEN_ALL_CORE_FT
APPTS ARE READY TO BE MADE: [X] YES    Best Family or Patient Contact (if needed):    Additional Information about above appointments (if needed):    1: Please follow up with PCP in 1-2 days   2: please follow up with Pulmonary in 1-2 days     3:     Other comments or requests:    APPTS ARE READY TO BE MADE: [X] YES    Best Family or Patient Contact (if needed):    Additional Information about above appointments (if needed):    1: Please follow up with PCP in 1-2 days   2: please follow up with Pulmonary in 1-2 days     3:     Other comments or requests:     Patient advised they did not want to proceed with scheduling appointments with the providers on their referrals. They will coordinate care on their own.

## 2025-07-08 NOTE — PROGRESS NOTE ADULT - ATTENDING COMMENTS
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    #Severe pulmonary hypertension  #Acute decompensated right heart failure  #Emphysema  #Intracardiac shunt   - CTPA with no evidence of PE, but with dilated pulmonary artery, RA/RV enlargement, RV thickening, reflux of contrast into the IVC, and hepatic congestion all suggestive of severe pulmonary hypertension and significant emphysema  - TTE showing severe pHTN with systolic/diastolic flattening of IVS  - RHC/LHC performed 7/3 concerning for severe pulmHTN with normal coronaries and normal CO/CI. Ao: 111/74/81, /13/58, /40/71, PCW, 28/24/22, RA 15/13/110, SVR 1938, TVR 2178, PVR [id number], venO2 shunt 60 c/w combined pre and post capillary pulmonary HTN  - VQ scan with low prob PE  - patient likely with group I pHTN (and component of group III), pending further serologies  - patient has known ASD vs PFO?- prior TTE with bubble study + for intra-atrial shunting; however cardiac MRI without evidence of shunting (may be as patient is now more euvolemic)    Recommendations:  - follow up rest of pHTN group I serologies  - cardiac MRI without evidence of shunt; showing RV systolic dysfunction with EF 35%  - recommend to initiate treatment of group I pHTN with sildenafil 10 mg TID (hold for SBP < 90)- monitor for side effects including hypotension, dizziness and lightheadedness  - patient nearing euvolemia- c/w lasix 40mg IV daily for now  - please obtain inpatient spirometry and 6 minute walk test  - please consult transplant pulmonary
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    #Severe pulmonary hypertension  #Acute decompensated right heart failure  #Emphysema  #Intracardiac shunt   - CTPA with no evidence of PE, but with dilated pulmonary artery, RA/RV enlargement, RV thickening, reflux of contrast into the IVC, and hepatic congestion all suggestive of severe pulmonary hypertension and significant emphysema  - TTE showing severe pHTN with systolic/diastolic flattening of IVS  - RHC/LHC performed 7/3 concerning for severe pulmHTN with normal coronaries and normal CO/CI. Ao: 111/74/81, /13/58, /40/71, PCW, 28/24/22, RA 15/13/110, SVR 1938, TVR 2178, PVR [id number], venO2 shunt 60 c/w combined pre and post capillary pulmonary HTN  - VQ scan with low prob PE  - patient likely with group I pHTN (and component of group III), pending further serologies  - patient has known ASD vs PFO?- prior TTE with bubble study + for intra-atrial shunting; however cardiac MRI without evidence of shunting (may be as patient is now more euvolemic)    Recommendations:  - follow up rest of pHTN group I serologies  - cardiac MRI without evidence of shunt; showing RV systolic dysfunction with EF 35%  -  initiates treatment of group I pHTN with sildenafil 10 mg TID (hold for SBP < 90)- monitor for side effects including hypotension, dizziness and lightheadedness  - patient nearing euvolemia- c/w lasix 40mg IV daily for now  - please obtain inpatient spirometry and 6 minute walk test  - please consult transplant pulmonary .     Spoke with patient at length regarding gravity of medical situation. Patient would like to sign out AMA. Encouraged close follow up with cardiology and pulmonary.
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 Found to have hypoxemia to 70% on room air requiring high flow nasal cannula +/- 100% NRB. CTPA with no evidence of PE, but with dilated pulmonary artery, RA/RV enlargement, RV thickening, reflux of contrast into the IVC, and congested liver all suggestive of severe pulmonary hypertension. Also with significant emphysema on imaging. Found on echo to have severe pulmonary hypertension with flattening of the interventricular septum with systole/diastole consistent with pressure and volume overload with severe RA/RV enlargement and decreased RV systolic function and estimated PASP 94 mm Hg. Bubble study pending.    - Please obtain limited echo with bubble study to rule out intracardiac or intrapulmonary shunt (e.g., PFO, ASD, VSD, congenital heart disease, pulmonary AVM)  - CT chest reviewed with radiology yesterday, no evidence of pulmonary AVM  - Profound hypoxemia likely due to pulmonary hypertension +/- R-->L shunt  - Recommend 100% NRB ATC given improved oxygenation with NRB compared to high flow nasal cannula  - Can use HFNC 60 LPM, 100% FiO2 while eating  - Will likely require home oxygen therapy  - Diuresis with furosemide 40 mg IV 1-2x/day  - Strict I/O's, close monitoring of kidney function and lytes  - Cardiology evaluation for right and left heart cath with shunt fraction and nitric oxide vasoreactivity testing (discussed with Dr. Blaise Jordan)  - V/Q scan with very low probability for PE, no evidence of CTEPH  - Possible component of emphysema/COPD contributing to pulmonary hypertension, but primary etiology likely WHO Group I  - There is no evidence of significant parenchymal lung disease on CT that is contributing to his pulmonary hypertension  - Follow-up HIV, KELLY, dsDNA, centromere, scleroderma, RNP, Anahi-1, Schistosoma antibodies  - Remaining workup unrevealing, including acute hepatitis labs, RF, CCP, A1AT  - Trend pro-BNP  - Will need outpatient complete PFTs to evaluate for presence/severity of COPD and rule out restrictive lung disease  - Will also require diagnostic PSG as outpatient to rule out concomitant AUDIE that may be contributing to his pulmonary hypertension  - DVT ppx with enoxaparin  - Discussed with patient and family at bedside
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 Found to have hypoxemia to 70% on room air requiring high flow nasal cannula +/- 100% NRB. CTPA with no evidence of PE, but with dilated pulmonary artery, RA/RV enlargement, RV thickening, reflux of contrast into the IVC, and hepatic congestion all suggestive of severe pulmonary hypertension. Also with significant emphysema on imaging. Found on echo to have severe pulmonary hypertension with flattening of the interventricular septum with systole/diastole consistent with pressure and volume overload with severe RA/RV enlargement and decreased RV systolic function and estimated PASP 94 mm Hg.     - Bedside bubble study today consistent with likely intracardiac shunt as bubbles appeared on left after 4-5 cycles  - Official bubble study pending  - Plan for left and right heart cath today with shunt fraction and nitric oxide vasoreactivity testing  - Suspect unrepaired intracardiac shunt  - CT chest reviewed with radiology previously, no evidence of pulmonary AVM  - Profound hypoxemia likely due to pulmonary hypertension + R-->L shunt  - Recommend 100% NRB ATC given improved oxygenation with NRB compared to high flow nasal cannula  - Today with reported normal oxygen saturation with low flow NC@6 LPM, which is ok as long as SpO2>90%  - Will likely require home oxygen therapy  - Diuresis with furosemide 40 mg IV 1-2x/day  - Strict I/O's, close monitoring of kidney function and lytes  - V/Q scan with very low probability for PE, no evidence of CTEPH  - Possible component of emphysema/COPD contributing to pulmonary hypertension, but primary etiology likely WHO Group I  - There is no evidence of significant parenchymal lung disease on CT that is contributing to his pulmonary hypertension  - Labs so far unrevealing, including HIV, acute hepatitis labs, RF, CCP, dsDNA, centromere, scleroderma, Anahi-1, A1AT. F/up KELLY  - Trend pro-BNP  - Will need outpatient complete PFTs to evaluate for presence/severity of COPD and rule out restrictive lung disease  - Will also require diagnostic PSG as outpatient to rule out concomitant AUDIE that may be contributing to his pulmonary hypertension  - DVT ppx with enoxaparin  - Discussed with patient at bedside

## 2025-07-08 NOTE — PROGRESS NOTE ADULT - SUBJECTIVE AND OBJECTIVE BOX
Interval Events:      REVIEW OF SYSTEMS:  Negative except as documented above.      OBJECTIVE:  ICU Vital Signs Last 24 Hrs  T(C): 36.3 (08 Jul 2025 06:28), Max: 36.4 (07 Jul 2025 20:06)  T(F): 97.3 (08 Jul 2025 06:28), Max: 97.5 (07 Jul 2025 20:06)  HR: 69 (08 Jul 2025 06:28) (69 - 76)  BP: 104/64 (08 Jul 2025 06:28) (100/64 - 104/64)  BP(mean): --  ABP: --  ABP(mean): --  RR: 18 (08 Jul 2025 06:28) (18 - 18)  SpO2: 88% (08 Jul 2025 06:28) (88% - 94%)    O2 Parameters below as of 08 Jul 2025 06:28  Patient On (Oxygen Delivery Method): room air              07-07 @ 07:01  -  07-08 @ 07:00  --------------------------------------------------------  IN: 200 mL / OUT: 0 mL / NET: 200 mL      CAPILLARY BLOOD GLUCOSE          PHYSICAL EXAM:  General: NAD  HEENT:  EOMI, sclera anicteric, moist mucus membranes  Neck: supple  Cardiovascular: RRR  Respiratory: CTAB, no wheezes, crackles, or rhonci  Abdomen: soft, nontender  Extremities: warm and well perfused, no edema, no clubbing  Skin: no rashes  Neurological: no focal deficits    HOSPITAL MEDICATIONS:  MEDICATIONS  (STANDING):  calamine/zinc oxide Lotion 1 Application(s) Topical two times a day  enoxaparin Injectable 40 milliGRAM(s) SubCutaneous every 24 hours  furosemide   Injectable 40 milliGRAM(s) IV Push two times a day  sildenafil (REVATIO) 10 milliGRAM(s) Oral three times a day  tamsulosin 0.4 milliGRAM(s) Oral at bedtime    MEDICATIONS  (PRN):  acetaminophen     Tablet .. 975 milliGRAM(s) Oral every 8 hours PRN Temp greater or equal to 38C (100.4F), Mild Pain (1 - 3)  oxyCODONE    IR 5 milliGRAM(s) Oral every 6 hours PRN Severe Pain (7 - 10)      LABS:                        16.5   8.88  )-----------( 169      ( 07 Jul 2025 06:38 )             49.8     Hgb Trend: 16.5<--, 16.4<--, 16.1<--, 15.8<--, 16.3<--  07-07    139  |  108  |  24[H]  ----------------------------<  90  3.8   |  16[L]  |  0.95    Ca    8.9      07 Jul 2025 06:39      Creatinine Trend: 0.95<--, 0.93<--, 0.87<--, 1.06<--, 1.26<--, 1.26<--    Urinalysis Basic - ( 07 Jul 2025 06:39 )    Color: x / Appearance: x / SG: x / pH: x  Gluc: 90 mg/dL / Ketone: x  / Bili: x / Urobili: x   Blood: x / Protein: x / Nitrite: x   Leuk Esterase: x / RBC: x / WBC x   Sq Epi: x / Non Sq Epi: x / Bacteria: x            MICROBIOLOGY:       RADIOLOGY:  [x] Reviewed and interpreted by me
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
## 2025-07-08 NOTE — CHART NOTE - NSCHARTNOTEFT_GEN_A_CORE
Pt refusing supplemental oxygen , Tele  and IVL   He  expressed a strong desire to leave the hospital, stating he needs sun to heal and feels fine without supplemental oxygen. His spouse, Renetta, echoed this sentiment. I explained the rationale for supplemental oxygen, given his documented ambulatory oxygen saturation of 87% and his diagnosis of severe pulmonary hypertension. I also emphasized the importance of continuous cardiac monitoring via telemetry given his history of syncope. The risks of leaving against medical advice, including potential worsening of his respiratory status, hypoxia, and the possibility of further syncopal episodes, were clearly communicated to both the patient and  spouse ,  Despite these explanations, the patient  remained insistent on leaving the hospital.  Pt left the hospital AMA .    Angélica Erwin NP-C [id number]

## 2025-07-08 NOTE — PROVIDER CONTACT NOTE (OTHER) - NAME OF MD/NP/PA/DO NOTIFIED:
ACP edy Erwin and Dr. Gabi Renee
Emelin Reyes-Monegro PA made aware.

## 2025-07-08 NOTE — DISCHARGE NOTE NURSING/CASE MANAGEMENT/SOCIAL WORK - FINANCIAL ASSISTANCE
[facility] provides services at a reduced cost to those who are determined to be eligible through [facility]’s financial assistance program. Information regarding [facility]’s financial assistance program can be found by going to [email or web address] or by calling [phone].

## 2025-07-08 NOTE — DISCHARGE NOTE PROVIDER - NSDCCPCAREPLAN_GEN_ALL_CORE_FT
PRINCIPAL DISCHARGE DIAGNOSIS  Diagnosis: Acute on chronic hypoxic respiratory failure  Assessment and Plan of Treatment: Pt sign out AMA    Follow up with Pulmonology for ongoing management of pulmonary hypertension, including consideration for home oxygen and further evaluation for lung transplant.  Follow up with Cardiology for management of syncope and pulmonary hypertension.  Outpatient polysomnography (PSG) to evaluate for obstructive sleep apnea.  Six-minute walk test and pulmonary function tests (PFTs) as outpatient.  Do not drive until cleared by physician.  Strict smoking cessation.      SECONDARY DISCHARGE DIAGNOSES  Diagnosis: Kidney stone  Assessment and Plan of Treatment: Follow up with Urology for management of nephrolithiasis.    Diagnosis: FTT (failure to thrive) in adult  Assessment and Plan of Treatment:   Follow up with Nutrition for dietary recommendations.      Diagnosis: Syncope  Assessment and Plan of Treatment: Pt sign out AMA    Do not drive until cleared by physician.  Strict smoking cessation.    Diagnosis: Hypoxia  Assessment and Plan of Treatment: Follow up with Pulmonology for ongoing management of pulmonary hypertension, including consideration for home oxygen and further evaluation for lung transplant.

## 2025-07-08 NOTE — PROVIDER CONTACT NOTE (OTHER) - REASON
briefly teri to 46 while pt asleep
Left flank pain 10/10 on pain scale
pt is requesting to sign out against medical advice
pt c/o facial numbness after receiving Dilaudid also left flank pain not relieved after the Dilaudid..

## 2025-07-08 NOTE — CHART NOTE - NSCHARTNOTEFT_GEN_A_CORE
Patient seen but refusing exam. He is requesting to sign out against medical advice. I have explained to him the concern for leaving with hypoxia down to 87% while ambulating - refused further testing as O2 sat as it was dropping per nurse. last O2 sat 90% on RA. the patient states he understands the risk of nonadherence with O2 still refusing O2. he understands this can lead to worsened hypoxia and can lead to death. he states he understands and able to repeat back to me, accepting risks and still wants to leave hospital against medical advice. d/w ACP.     Dr. Gabi Renee  Medicine Hospitalist  [employer]

## 2025-07-08 NOTE — PROGRESS NOTE ADULT - REASON FOR ADMISSION
abd pain

## 2025-07-08 NOTE — PROGRESS NOTE ADULT - SUBJECTIVE AND OBJECTIVE BOX
[facility] Attending Follow-up Note     Patient seen and examined at bedside.    Overnight Events:     events noted     REVIEW OF SYSTEMS:  Constitutional:     [x ] negative [ ] fevers [ ] chills [ ] weight loss [ ] weight gain  HEENT:                  [x ] negative [ ] dry eyes [ ] eye irritation [ ] postnasal drip [ ] nasal congestion  CV:                         [ x] negative  [ ] chest pain [ ] orthopnea [ ] palpitations [ ] murmur  Resp:                     [ x] negative [ ] cough [ ] shortness of breath [ ] dyspnea [ ] wheezing [ ] sputum [ ]hemoptysis  GI:                          [ x] negative [ ] nausea [ ] vomiting [ ] diarrhea [ ] constipation [ ] abd pain [ ] dysphagia   :                        [ x] negative [ ] dysuria [ ] nocturia [ ] hematuria [ ] increased urinary frequency  Musculoskeletal: [ x] negative [ ] back pain [ ] myalgias [ ] arthralgias [ ] fracture  Skin:                       [ x] negative [ ] rash [ ] itch  Neurological:        [x ] negative [ ] headache [ ] dizziness [ ] syncope [ ] weakness [ ] numbness  Psychiatric:           [ x] negative [ ] anxiety [ ] depression  Endocrine:            [ x] negative [ ] diabetes [ ] thyroid problem  Heme/Lymph:      [ x] negative [ ] anemia [ ] bleeding problem  Allergic/Immune: [ x] negative [ ] itchy eyes [ ] nasal discharge [ ] hives [ ] angioedema    [ x] All other systems negative  [ ] Unable to assess ROS due to    Current Meds:  acetaminophen     Tablet .. 975 milliGRAM(s) Oral every 8 hours PRN  calamine/zinc oxide Lotion 1 Application(s) Topical two times a day  enoxaparin Injectable 40 milliGRAM(s) SubCutaneous every 24 hours  furosemide   Injectable 40 milliGRAM(s) IV Push two times a day  oxyCODONE    IR 5 milliGRAM(s) Oral every 6 hours PRN  sildenafil (REVATIO) 10 milliGRAM(s) Oral three times a day  tamsulosin 0.4 milliGRAM(s) Oral at bedtime      PAST MEDICAL & SURGICAL HISTORY:  Former heavy tobacco smoker      Kidney stone      No significant past surgical history          Vitals:  T(F): 97.3 (07-08), Max: 97.5 (07-07)  HR: 69 (07-08) (69 - 71)  BP: 104/64 (07-08) (100/64 - 104/64)  RR: 18 (07-08)  SpO2: 88% (07-08)  I&O's Summary    07 Jul 2025 07:01  -  08 Jul 2025 07:00  --------------------------------------------------------  IN: 200 mL / OUT: 0 mL / NET: 200 mL        Physical Exam:  Appearance: No acute distress  HENT: No JVD   Cardiovascular: RRR, S1/S2, no murmurs  Respiratory: CTABL  Gastrointestinal: soft, NT ND, +BS  Musculoskeletal: No clubbing, no edema   Neurologic: Non-focal  Skin: No rashes, ecchymoses, or cyanosis                          16.5   8.88  )-----------( 169      ( 07 Jul 2025 06:38 )             49.8     07-07    139  |  108  |  24[H]  ----------------------------<  90  3.8   |  16[L]  |  0.95    Ca    8.9      07 Jul 2025 06:39                    Cardiovascular Testings:

## 2025-07-08 NOTE — PROVIDER CONTACT NOTE (OTHER) - ACTION/TREATMENT ORDERED:
IV Tylenol
no new orders. will continue monitor the pt.
YUAN Erwin and Dr. Gabi Renee informed and made aware.
no new orders at this time. Jacqueline PRADO seeing the pt at bedside.

## 2025-07-08 NOTE — DISCHARGE NOTE NURSING/CASE MANAGEMENT/SOCIAL WORK - PATIENT PORTAL LINK FT
You can access the FollowMyHealth Patient Portal offered by [facility] by registering at the following website: [email or web address]. By joining [employer]’s FollowMyHealth portal, you will also be able to view your health information using other applications (apps) compatible with our system.

## 2025-07-08 NOTE — PROGRESS NOTE ADULT - TIME BILLING
Review of patient records, including history, laboratory data, and imaging. Patient evaluation and assessment. Coordination of care. Time excludes teaching or procedures.
Medical management as above, reviewing chart and coordinating care with primary team/staff, as well as reviewing vitals, radiology, medication list, recent labs, and prior records.    Does not include teaching time.
Medical management as above, reviewing chart and coordinating care with primary team/staff, as well as reviewing vitals, radiology, medication list, recent labs, and prior records.    Does not include teaching time.
- Ordering, reviewing, and interpreting labs, testing, and imaging.  - Independently obtaining a review of systems and performing a physical exam  - Reviewing consultant documentation/recommendations in addition to discussing plan of care with consultants.  - Counselling and educating patient and family regarding interpretation of aforementioned items and plan of care.

## 2025-07-08 NOTE — DISCHARGE NOTE PROVIDER - PROVIDER TOKENS
PROVIDER:[TOKEN:[[id number]],FOLLOWUP:[1-3 days]],PROVIDER:[TOKEN:[[id number]],FOLLOWUP:[1-3 days]],PROVIDER:[TOKEN:[[id number]]]

## 2025-07-08 NOTE — PROVIDER CONTACT NOTE (OTHER) - BACKGROUND
55 year-old male former extensive smoker, kidney stone presents with severe abdominal pain, found be hypoxic to 80's RA
admitted for TANO, left ureter stent, and hypoxia
admitted for syncope, TANO left ureter stone  LR at 100ml/hr.
admitted for TANO, left ureter stone, FTT& syncope

## 2025-07-08 NOTE — PROVIDER CONTACT NOTE (OTHER) - SITUATION
pt leaving AMA.  the patient states he understands the risk of nonadherence with O2 still refusing O2. he understands this can lead to worsened hypoxia and can lead to death.
pt c/o facial numbness after receiving Dilaudid also left flank pain not relieved after the Dilaudid..
briefly teri to 46 while pt asleep. SB50's on tele monitor. soft BP 92/58
Left flank pain 10/10 on pain scale  IV Tylenol

## 2025-07-08 NOTE — DISCHARGE NOTE PROVIDER - CARE PROVIDER_API CALL
Jess Klein  Pulmonary Disease  [address] [address] [place] [id number]  Phone: [phone]  Fax: [phone]  Follow Up Time: 1-3 days    Blaise Jordan  Cardiovascular Disease  [address] [address] [place] [address], [address] [place] [address]  [place] [address]  Phone: [phone]  Fax: [phone]  Follow Up Time: 1-3 days    Hazel Stauffer  Internal Medicine  [address] [address]  Phone: [phone]  Fax: [phone]  Follow Up Time:

## 2025-07-08 NOTE — PROGRESS NOTE ADULT - PROVIDER SPECIALTY LIST ADULT
Cardiology
Cardiology
Hospitalist
Cardiology
Pulmonology
Pulmonology
Cardiology
Pulmonology
Pulmonology
Cardiology
Cardiology
Hospitalist
Pulmonology
Hospitalist

## 2025-07-18 PROBLEM — Z87.891 PERSONAL HISTORY OF NICOTINE DEPENDENCE: Chronic | Status: ACTIVE | Noted: 2025-06-29

## 2025-07-18 PROBLEM — N20.0 CALCULUS OF KIDNEY: Chronic | Status: ACTIVE | Noted: 2025-06-29

## 2025-08-19 ENCOUNTER — NON-APPOINTMENT (OUTPATIENT)
Age: 55
End: 2025-08-19

## 2025-08-19 ENCOUNTER — APPOINTMENT (OUTPATIENT)
Dept: PULMONOLOGY | Facility: CLINIC | Age: 55
End: 2025-08-19
Payer: COMMERCIAL

## 2025-08-19 VITALS
OXYGEN SATURATION: 89 % | BODY MASS INDEX: 16.94 KG/M2 | WEIGHT: 132 LBS | HEIGHT: 74 IN | TEMPERATURE: 97.2 F | DIASTOLIC BLOOD PRESSURE: 64 MMHG | HEART RATE: 74 BPM | SYSTOLIC BLOOD PRESSURE: 99 MMHG | RESPIRATION RATE: 16 BRPM

## 2025-08-19 DIAGNOSIS — J43.2 CENTRILOBULAR EMPHYSEMA: ICD-10-CM

## 2025-08-19 DIAGNOSIS — Z82.62 FAMILY HISTORY OF OSTEOPOROSIS: ICD-10-CM

## 2025-08-19 DIAGNOSIS — R06.83 SNORING: ICD-10-CM

## 2025-08-19 DIAGNOSIS — I27.21 SECONDARY PULMONARY ARTERIAL HYPERTENSION: ICD-10-CM

## 2025-08-19 DIAGNOSIS — J96.11 CHRONIC RESPIRATORY FAILURE WITH HYPOXIA: ICD-10-CM

## 2025-08-19 DIAGNOSIS — F17.210 NICOTINE DEPENDENCE, CIGARETTES, UNCOMPLICATED: ICD-10-CM

## 2025-08-19 DIAGNOSIS — Z80.0 FAMILY HISTORY OF MALIGNANT NEOPLASM OF DIGESTIVE ORGANS: ICD-10-CM

## 2025-08-19 DIAGNOSIS — Z87.891 PERSONAL HISTORY OF NICOTINE DEPENDENCE: ICD-10-CM

## 2025-08-19 DIAGNOSIS — Z12.2 ENCOUNTER FOR SCREENING FOR MALIGNANT NEOPLASM OF RESPIRATORY ORGANS: ICD-10-CM

## 2025-08-19 DIAGNOSIS — Q21.10 ATRIAL SEPTAL DEFECT, UNSPECIFIED: ICD-10-CM

## 2025-08-19 DIAGNOSIS — Z78.9 OTHER SPECIFIED HEALTH STATUS: ICD-10-CM

## 2025-08-19 DIAGNOSIS — U07.1 COVID-19: ICD-10-CM

## 2025-08-19 DIAGNOSIS — J10.1 INFLUENZA DUE TO OTHER IDENTIFIED INFLUENZA VIRUS WITH OTHER RESPIRATORY MANIFESTATIONS: ICD-10-CM

## 2025-08-19 DIAGNOSIS — R06.02 SHORTNESS OF BREATH: ICD-10-CM

## 2025-08-19 PROCEDURE — 99406 BEHAV CHNG SMOKING 3-10 MIN: CPT | Mod: 25

## 2025-08-19 PROCEDURE — 94727 GAS DIL/WSHOT DETER LNG VOL: CPT

## 2025-08-19 PROCEDURE — 95012 NITRIC OXIDE EXP GAS DETER: CPT

## 2025-08-19 PROCEDURE — 94799 UNLISTED PULMONARY SVC/PX: CPT

## 2025-08-19 PROCEDURE — 94060 EVALUATION OF WHEEZING: CPT

## 2025-08-19 PROCEDURE — 94729 DIFFUSING CAPACITY: CPT

## 2025-08-19 PROCEDURE — 71046 X-RAY EXAM CHEST 2 VIEWS: CPT

## 2025-08-19 PROCEDURE — 94618 PULMONARY STRESS TESTING: CPT

## 2025-08-19 PROCEDURE — 99215 OFFICE O/P EST HI 40 MIN: CPT | Mod: 25

## 2025-08-19 RX ORDER — TIOTROPIUM BROMIDE AND OLODATEROL 3.124; 2.736 UG/1; UG/1
2.5-2.5 SPRAY, METERED RESPIRATORY (INHALATION) DAILY
Qty: 3 | Refills: 0 | Status: ACTIVE | COMMUNITY
Start: 2025-08-19 | End: 1900-01-01

## 2025-08-19 RX ORDER — SILDENAFIL CITRATE 10 MG/ML
10 POWDER, FOR SUSPENSION ORAL
Refills: 0 | Status: ACTIVE | COMMUNITY

## 2025-08-21 ENCOUNTER — NON-APPOINTMENT (OUTPATIENT)
Age: 55
End: 2025-08-21

## 2025-08-25 LAB
A1AT SERPL-MCNC: 136 MG/DL
ALBUMIN SERPL ELPH-MCNC: 4.2 G/DL
ALP BLD-CCNC: 78 U/L
ALT SERPL-CCNC: 24 U/L
ANION GAP SERPL CALC-SCNC: 14 MMOL/L
AST SERPL-CCNC: 22 U/L
BILIRUB SERPL-MCNC: 1.2 MG/DL
BUN SERPL-MCNC: 14 MG/DL
CALCIUM SERPL-MCNC: 9.2 MG/DL
CHLORIDE SERPL-SCNC: 108 MMOL/L
CO2 SERPL-SCNC: 18 MMOL/L
CREAT SERPL-MCNC: 0.84 MG/DL
EGFRCR SERPLBLD CKD-EPI 2021: 102 ML/MIN/1.73M2
GLUCOSE SERPL-MCNC: 134 MG/DL
POTASSIUM SERPL-SCNC: 3.8 MMOL/L
PROT SERPL-MCNC: 6.1 G/DL
SODIUM SERPL-SCNC: 140 MMOL/L

## 2025-08-26 LAB
BASOPHILS # BLD AUTO: 0.04 K/UL
BASOPHILS NFR BLD AUTO: 0.5 %
EOSINOPHIL # BLD AUTO: 0.07 K/UL
EOSINOPHIL # BLD MANUAL: 80 /UL
EOSINOPHIL NFR BLD AUTO: 1 %
HCT VFR BLD CALC: 47 %
HGB BLD-MCNC: 15.7 G/DL
IMM GRANULOCYTES NFR BLD AUTO: 0.1 %
LYMPHOCYTES # BLD AUTO: 2.11 K/UL
LYMPHOCYTES NFR BLD AUTO: 28.8 %
MAN DIFF?: NORMAL
MCHC RBC-ENTMCNC: 31.2 PG
MCHC RBC-ENTMCNC: 33.4 G/DL
MCV RBC AUTO: 93.3 FL
MONOCYTES # BLD AUTO: 0.34 K/UL
MONOCYTES NFR BLD AUTO: 4.6 %
NEUTROPHILS # BLD AUTO: 4.75 K/UL
NEUTROPHILS NFR BLD AUTO: 65 %
NT-PROBNP SERPL-MCNC: 475 PG/ML
PLATELET # BLD AUTO: 143 K/UL
RBC # BLD: 5.04 M/UL
RBC # FLD: 14.9 %
WBC # FLD AUTO: 7.32 K/UL

## 2025-08-29 LAB
A1AT PHENOTYP SERPL-IMP: NORMAL
A1AT SERPL-MCNC: 132 MG/DL